# Patient Record
Sex: MALE | Race: WHITE | NOT HISPANIC OR LATINO
[De-identification: names, ages, dates, MRNs, and addresses within clinical notes are randomized per-mention and may not be internally consistent; named-entity substitution may affect disease eponyms.]

---

## 2019-03-14 ENCOUNTER — TRANSCRIPTION ENCOUNTER (OUTPATIENT)
Age: 69
End: 2019-03-14

## 2019-03-14 ENCOUNTER — INPATIENT (INPATIENT)
Facility: HOSPITAL | Age: 69
LOS: 2 days | Discharge: HOME | DRG: 336 | End: 2019-03-17
Attending: FAMILY MEDICINE | Admitting: SPECIALIST
Payer: MEDICARE

## 2019-03-14 VITALS
SYSTOLIC BLOOD PRESSURE: 125 MMHG | WEIGHT: 244.93 LBS | HEART RATE: 92 BPM | RESPIRATION RATE: 16 BRPM | HEIGHT: 71 IN | OXYGEN SATURATION: 94 % | DIASTOLIC BLOOD PRESSURE: 75 MMHG | TEMPERATURE: 98 F

## 2019-03-14 DIAGNOSIS — Z98.89 OTHER SPECIFIED POSTPROCEDURAL STATES: Chronic | ICD-10-CM

## 2019-03-14 LAB
ALBUMIN SERPL ELPH-MCNC: 4 G/DL — SIGNIFICANT CHANGE UP (ref 3.3–5)
ALP SERPL-CCNC: 85 U/L — SIGNIFICANT CHANGE UP (ref 40–120)
ALT FLD-CCNC: 31 U/L — SIGNIFICANT CHANGE UP (ref 12–78)
ANION GAP SERPL CALC-SCNC: 8 MMOL/L — SIGNIFICANT CHANGE UP (ref 5–17)
APPEARANCE UR: CLEAR — SIGNIFICANT CHANGE UP
AST SERPL-CCNC: 22 U/L — SIGNIFICANT CHANGE UP (ref 15–37)
BACTERIA # UR AUTO: ABNORMAL
BILIRUB SERPL-MCNC: 0.4 MG/DL — SIGNIFICANT CHANGE UP (ref 0.2–1.2)
BILIRUB UR-MCNC: NEGATIVE — SIGNIFICANT CHANGE UP
BUN SERPL-MCNC: 23 MG/DL — SIGNIFICANT CHANGE UP (ref 7–23)
CALCIUM SERPL-MCNC: 8.9 MG/DL — SIGNIFICANT CHANGE UP (ref 8.5–10.1)
CHLORIDE SERPL-SCNC: 104 MMOL/L — SIGNIFICANT CHANGE UP (ref 96–108)
CO2 SERPL-SCNC: 28 MMOL/L — SIGNIFICANT CHANGE UP (ref 22–31)
COLOR SPEC: YELLOW — SIGNIFICANT CHANGE UP
COMMENT - URINE: SIGNIFICANT CHANGE UP
CREAT SERPL-MCNC: 1.2 MG/DL — SIGNIFICANT CHANGE UP (ref 0.5–1.3)
DIFF PNL FLD: ABNORMAL
EPI CELLS # UR: SIGNIFICANT CHANGE UP
GLUCOSE SERPL-MCNC: 122 MG/DL — HIGH (ref 70–99)
GLUCOSE UR QL: NEGATIVE — SIGNIFICANT CHANGE UP
HCT VFR BLD CALC: 44.2 % — SIGNIFICANT CHANGE UP (ref 39–50)
HGB BLD-MCNC: 14.3 G/DL — SIGNIFICANT CHANGE UP (ref 13–17)
KETONES UR-MCNC: NEGATIVE — SIGNIFICANT CHANGE UP
LEUKOCYTE ESTERASE UR-ACNC: NEGATIVE — SIGNIFICANT CHANGE UP
MCHC RBC-ENTMCNC: 29.4 PG — SIGNIFICANT CHANGE UP (ref 27–34)
MCHC RBC-ENTMCNC: 32.4 GM/DL — SIGNIFICANT CHANGE UP (ref 32–36)
MCV RBC AUTO: 90.9 FL — SIGNIFICANT CHANGE UP (ref 80–100)
NITRITE UR-MCNC: NEGATIVE — SIGNIFICANT CHANGE UP
NRBC # BLD: 0 /100 WBCS — SIGNIFICANT CHANGE UP (ref 0–0)
PH UR: 5 — SIGNIFICANT CHANGE UP (ref 5–8)
PLATELET # BLD AUTO: 265 K/UL — SIGNIFICANT CHANGE UP (ref 150–400)
POTASSIUM SERPL-MCNC: 4.3 MMOL/L — SIGNIFICANT CHANGE UP (ref 3.5–5.3)
POTASSIUM SERPL-SCNC: 4.3 MMOL/L — SIGNIFICANT CHANGE UP (ref 3.5–5.3)
PROT SERPL-MCNC: 8.3 G/DL — SIGNIFICANT CHANGE UP (ref 6–8.3)
PROT UR-MCNC: 25 MG/DL
RBC # BLD: 4.86 M/UL — SIGNIFICANT CHANGE UP (ref 4.2–5.8)
RBC # FLD: 13.5 % — SIGNIFICANT CHANGE UP (ref 10.3–14.5)
RBC CASTS # UR COMP ASSIST: SIGNIFICANT CHANGE UP /HPF (ref 0–4)
SODIUM SERPL-SCNC: 140 MMOL/L — SIGNIFICANT CHANGE UP (ref 135–145)
SP GR SPEC: 1.02 — SIGNIFICANT CHANGE UP (ref 1.01–1.02)
UROBILINOGEN FLD QL: NEGATIVE — SIGNIFICANT CHANGE UP
WBC # BLD: 13.32 K/UL — HIGH (ref 3.8–10.5)
WBC # FLD AUTO: 13.32 K/UL — HIGH (ref 3.8–10.5)
WBC UR QL: SIGNIFICANT CHANGE UP

## 2019-03-14 RX ORDER — SODIUM CHLORIDE 9 MG/ML
1000 INJECTION INTRAMUSCULAR; INTRAVENOUS; SUBCUTANEOUS ONCE
Qty: 0 | Refills: 0 | Status: COMPLETED | OUTPATIENT
Start: 2019-03-14 | End: 2019-03-14

## 2019-03-14 RX ORDER — ONDANSETRON 8 MG/1
4 TABLET, FILM COATED ORAL ONCE
Qty: 0 | Refills: 0 | Status: COMPLETED | OUTPATIENT
Start: 2019-03-14 | End: 2019-03-14

## 2019-03-14 RX ORDER — HYDROMORPHONE HYDROCHLORIDE 2 MG/ML
0.5 INJECTION INTRAMUSCULAR; INTRAVENOUS; SUBCUTANEOUS ONCE
Qty: 0 | Refills: 0 | Status: DISCONTINUED | OUTPATIENT
Start: 2019-03-14 | End: 2019-03-14

## 2019-03-14 RX ADMIN — HYDROMORPHONE HYDROCHLORIDE 0.5 MILLIGRAM(S): 2 INJECTION INTRAMUSCULAR; INTRAVENOUS; SUBCUTANEOUS at 23:16

## 2019-03-14 RX ADMIN — ONDANSETRON 4 MILLIGRAM(S): 8 TABLET, FILM COATED ORAL at 23:16

## 2019-03-14 RX ADMIN — HYDROMORPHONE HYDROCHLORIDE 0.5 MILLIGRAM(S): 2 INJECTION INTRAMUSCULAR; INTRAVENOUS; SUBCUTANEOUS at 23:50

## 2019-03-14 RX ADMIN — SODIUM CHLORIDE 1000 MILLILITER(S): 9 INJECTION INTRAMUSCULAR; INTRAVENOUS; SUBCUTANEOUS at 23:15

## 2019-03-14 NOTE — ED ADULT NURSE NOTE - NSIMPLEMENTINTERV_GEN_ALL_ED
Implemented All Universal Safety Interventions:  [place] to call system. Call bell, personal items and telephone within reach. Instruct patient to call for assistance. Room bathroom lighting operational. Non-slip footwear when patient is off stretcher. Physically safe environment: no spills, clutter or unnecessary equipment. Stretcher in lowest position, wheels locked, appropriate side rails in place.

## 2019-03-14 NOTE — ED ADULT NURSE NOTE - OBJECTIVE STATEMENT
Received to the ED a&ox4, with abd pain in RLQ, rebound tenderness present and pain radiates to mid section of abd.

## 2019-03-15 ENCOUNTER — RESULT REVIEW (OUTPATIENT)
Age: 69
End: 2019-03-15

## 2019-03-15 DIAGNOSIS — Z98.890 OTHER SPECIFIED POSTPROCEDURAL STATES: Chronic | ICD-10-CM

## 2019-03-15 DIAGNOSIS — K37 UNSPECIFIED APPENDICITIS: ICD-10-CM

## 2019-03-15 DIAGNOSIS — Z90.49 ACQUIRED ABSENCE OF OTHER SPECIFIED PARTS OF DIGESTIVE TRACT: Chronic | ICD-10-CM

## 2019-03-15 LAB
APTT BLD: 35.6 SEC — SIGNIFICANT CHANGE UP (ref 27.5–36.3)
HCV AB S/CO SERPL IA: 0.08 S/CO — SIGNIFICANT CHANGE UP (ref 0–0.79)
HCV AB SERPL-IMP: SIGNIFICANT CHANGE UP
INR BLD: 1.27 RATIO — HIGH (ref 0.88–1.16)
LACTATE SERPL-SCNC: 0.5 MMOL/L — LOW (ref 0.7–2)
PROTHROM AB SERPL-ACNC: 14.5 SEC — HIGH (ref 10–12.9)

## 2019-03-15 PROCEDURE — 44970 LAPAROSCOPY APPENDECTOMY: CPT | Mod: AS

## 2019-03-15 PROCEDURE — 74176 CT ABD & PELVIS W/O CONTRAST: CPT | Mod: 26

## 2019-03-15 PROCEDURE — 99285 EMERGENCY DEPT VISIT HI MDM: CPT

## 2019-03-15 PROCEDURE — 99222 1ST HOSP IP/OBS MODERATE 55: CPT

## 2019-03-15 PROCEDURE — 93010 ELECTROCARDIOGRAM REPORT: CPT | Mod: 76

## 2019-03-15 PROCEDURE — 88304 TISSUE EXAM BY PATHOLOGIST: CPT | Mod: 26

## 2019-03-15 PROCEDURE — 71045 X-RAY EXAM CHEST 1 VIEW: CPT | Mod: 26

## 2019-03-15 PROCEDURE — 44970 LAPAROSCOPY APPENDECTOMY: CPT | Mod: 22

## 2019-03-15 RX ORDER — ATORVASTATIN CALCIUM 80 MG/1
20 TABLET, FILM COATED ORAL AT BEDTIME
Qty: 0 | Refills: 0 | Status: DISCONTINUED | OUTPATIENT
Start: 2019-03-15 | End: 2019-03-17

## 2019-03-15 RX ORDER — PIPERACILLIN AND TAZOBACTAM 4; .5 G/20ML; G/20ML
3.38 INJECTION, POWDER, LYOPHILIZED, FOR SOLUTION INTRAVENOUS ONCE
Qty: 0 | Refills: 0 | Status: COMPLETED | OUTPATIENT
Start: 2019-03-15 | End: 2019-03-15

## 2019-03-15 RX ORDER — HYDROMORPHONE HYDROCHLORIDE 2 MG/ML
1 INJECTION INTRAMUSCULAR; INTRAVENOUS; SUBCUTANEOUS EVERY 4 HOURS
Qty: 0 | Refills: 0 | Status: DISCONTINUED | OUTPATIENT
Start: 2019-03-15 | End: 2019-03-15

## 2019-03-15 RX ORDER — GABAPENTIN 400 MG/1
1200 CAPSULE ORAL
Qty: 0 | Refills: 0 | Status: DISCONTINUED | OUTPATIENT
Start: 2019-03-15 | End: 2019-03-17

## 2019-03-15 RX ORDER — HYDROMORPHONE HYDROCHLORIDE 2 MG/ML
1 INJECTION INTRAMUSCULAR; INTRAVENOUS; SUBCUTANEOUS
Qty: 0 | Refills: 0 | Status: DISCONTINUED | OUTPATIENT
Start: 2019-03-15 | End: 2019-03-15

## 2019-03-15 RX ORDER — GLUCAGON INJECTION, SOLUTION 0.5 MG/.1ML
1 INJECTION, SOLUTION SUBCUTANEOUS ONCE
Qty: 0 | Refills: 0 | Status: DISCONTINUED | OUTPATIENT
Start: 2019-03-15 | End: 2019-03-15

## 2019-03-15 RX ORDER — DEXTROSE 50 % IN WATER 50 %
15 SYRINGE (ML) INTRAVENOUS ONCE
Qty: 0 | Refills: 0 | Status: DISCONTINUED | OUTPATIENT
Start: 2019-03-15 | End: 2019-03-15

## 2019-03-15 RX ORDER — ATORVASTATIN CALCIUM 80 MG/1
1 TABLET, FILM COATED ORAL
Qty: 0 | Refills: 0 | COMMUNITY

## 2019-03-15 RX ORDER — DEXTROSE 50 % IN WATER 50 %
12.5 SYRINGE (ML) INTRAVENOUS ONCE
Qty: 0 | Refills: 0 | Status: DISCONTINUED | OUTPATIENT
Start: 2019-03-15 | End: 2019-03-17

## 2019-03-15 RX ORDER — SODIUM CHLORIDE 9 MG/ML
1000 INJECTION, SOLUTION INTRAVENOUS
Qty: 0 | Refills: 0 | Status: DISCONTINUED | OUTPATIENT
Start: 2019-03-15 | End: 2019-03-17

## 2019-03-15 RX ORDER — PIPERACILLIN AND TAZOBACTAM 4; .5 G/20ML; G/20ML
3.38 INJECTION, POWDER, LYOPHILIZED, FOR SOLUTION INTRAVENOUS EVERY 8 HOURS
Qty: 0 | Refills: 0 | Status: DISCONTINUED | OUTPATIENT
Start: 2019-03-15 | End: 2019-03-15

## 2019-03-15 RX ORDER — PIPERACILLIN AND TAZOBACTAM 4; .5 G/20ML; G/20ML
3.38 INJECTION, POWDER, LYOPHILIZED, FOR SOLUTION INTRAVENOUS EVERY 8 HOURS
Qty: 0 | Refills: 0 | Status: DISCONTINUED | OUTPATIENT
Start: 2019-03-15 | End: 2019-03-17

## 2019-03-15 RX ORDER — GABAPENTIN 400 MG/1
2 CAPSULE ORAL
Qty: 0 | Refills: 0 | COMMUNITY

## 2019-03-15 RX ORDER — ONDANSETRON 8 MG/1
4 TABLET, FILM COATED ORAL ONCE
Qty: 0 | Refills: 0 | Status: COMPLETED | OUTPATIENT
Start: 2019-03-15 | End: 2019-03-15

## 2019-03-15 RX ORDER — HYDROMORPHONE HYDROCHLORIDE 2 MG/ML
1 INJECTION INTRAMUSCULAR; INTRAVENOUS; SUBCUTANEOUS EVERY 4 HOURS
Qty: 0 | Refills: 0 | Status: DISCONTINUED | OUTPATIENT
Start: 2019-03-15 | End: 2019-03-17

## 2019-03-15 RX ORDER — CEFAZOLIN SODIUM 1 G
2000 VIAL (EA) INJECTION ONCE
Qty: 0 | Refills: 0 | Status: COMPLETED | OUTPATIENT
Start: 2019-03-15 | End: 2019-03-15

## 2019-03-15 RX ORDER — INSULIN LISPRO 100/ML
VIAL (ML) SUBCUTANEOUS
Qty: 0 | Refills: 0 | Status: DISCONTINUED | OUTPATIENT
Start: 2019-03-15 | End: 2019-03-17

## 2019-03-15 RX ORDER — IOHEXOL 300 MG/ML
30 INJECTION, SOLUTION INTRAVENOUS ONCE
Qty: 0 | Refills: 0 | Status: COMPLETED | OUTPATIENT
Start: 2019-03-15 | End: 2019-03-15

## 2019-03-15 RX ORDER — SODIUM CHLORIDE 9 MG/ML
1000 INJECTION, SOLUTION INTRAVENOUS
Qty: 0 | Refills: 0 | Status: DISCONTINUED | OUTPATIENT
Start: 2019-03-15 | End: 2019-03-15

## 2019-03-15 RX ORDER — DEXTROSE 50 % IN WATER 50 %
15 SYRINGE (ML) INTRAVENOUS ONCE
Qty: 0 | Refills: 0 | Status: DISCONTINUED | OUTPATIENT
Start: 2019-03-15 | End: 2019-03-17

## 2019-03-15 RX ORDER — HYDROMORPHONE HYDROCHLORIDE 2 MG/ML
0.5 INJECTION INTRAMUSCULAR; INTRAVENOUS; SUBCUTANEOUS ONCE
Qty: 0 | Refills: 0 | Status: DISCONTINUED | OUTPATIENT
Start: 2019-03-15 | End: 2019-03-15

## 2019-03-15 RX ORDER — ONDANSETRON 8 MG/1
4 TABLET, FILM COATED ORAL EVERY 6 HOURS
Qty: 0 | Refills: 0 | Status: DISCONTINUED | OUTPATIENT
Start: 2019-03-15 | End: 2019-03-15

## 2019-03-15 RX ORDER — HYDROMORPHONE HYDROCHLORIDE 2 MG/ML
0.5 INJECTION INTRAMUSCULAR; INTRAVENOUS; SUBCUTANEOUS EVERY 4 HOURS
Qty: 0 | Refills: 0 | Status: DISCONTINUED | OUTPATIENT
Start: 2019-03-15 | End: 2019-03-15

## 2019-03-15 RX ORDER — DEXTROSE 50 % IN WATER 50 %
25 SYRINGE (ML) INTRAVENOUS ONCE
Qty: 0 | Refills: 0 | Status: DISCONTINUED | OUTPATIENT
Start: 2019-03-15 | End: 2019-03-15

## 2019-03-15 RX ORDER — DEXTROSE 50 % IN WATER 50 %
25 SYRINGE (ML) INTRAVENOUS ONCE
Qty: 0 | Refills: 0 | Status: DISCONTINUED | OUTPATIENT
Start: 2019-03-15 | End: 2019-03-17

## 2019-03-15 RX ORDER — HYDROMORPHONE HYDROCHLORIDE 2 MG/ML
0.5 INJECTION INTRAMUSCULAR; INTRAVENOUS; SUBCUTANEOUS EVERY 4 HOURS
Qty: 0 | Refills: 0 | Status: DISCONTINUED | OUTPATIENT
Start: 2019-03-15 | End: 2019-03-17

## 2019-03-15 RX ORDER — INSULIN LISPRO 100/ML
VIAL (ML) SUBCUTANEOUS
Qty: 0 | Refills: 0 | Status: DISCONTINUED | OUTPATIENT
Start: 2019-03-15 | End: 2019-03-15

## 2019-03-15 RX ORDER — ASPIRIN/CALCIUM CARB/MAGNESIUM 324 MG
1 TABLET ORAL
Qty: 0 | Refills: 0 | COMMUNITY

## 2019-03-15 RX ORDER — DEXTROSE 50 % IN WATER 50 %
12.5 SYRINGE (ML) INTRAVENOUS ONCE
Qty: 0 | Refills: 0 | Status: DISCONTINUED | OUTPATIENT
Start: 2019-03-15 | End: 2019-03-15

## 2019-03-15 RX ORDER — ACETAMINOPHEN 500 MG
1000 TABLET ORAL ONCE
Qty: 0 | Refills: 0 | Status: COMPLETED | OUTPATIENT
Start: 2019-03-15 | End: 2019-03-15

## 2019-03-15 RX ORDER — GLUCAGON INJECTION, SOLUTION 0.5 MG/.1ML
1 INJECTION, SOLUTION SUBCUTANEOUS ONCE
Qty: 0 | Refills: 0 | Status: DISCONTINUED | OUTPATIENT
Start: 2019-03-15 | End: 2019-03-17

## 2019-03-15 RX ORDER — SODIUM CHLORIDE 9 MG/ML
1000 INJECTION INTRAMUSCULAR; INTRAVENOUS; SUBCUTANEOUS
Qty: 0 | Refills: 0 | Status: DISCONTINUED | OUTPATIENT
Start: 2019-03-15 | End: 2019-03-16

## 2019-03-15 RX ORDER — METFORMIN HYDROCHLORIDE 850 MG/1
1 TABLET ORAL
Qty: 0 | Refills: 0 | COMMUNITY

## 2019-03-15 RX ORDER — ACETAMINOPHEN 500 MG
1000 TABLET ORAL ONCE
Qty: 0 | Refills: 0 | Status: COMPLETED | OUTPATIENT
Start: 2019-03-16 | End: 2019-03-16

## 2019-03-15 RX ORDER — HYDROMORPHONE HYDROCHLORIDE 2 MG/ML
0.5 INJECTION INTRAMUSCULAR; INTRAVENOUS; SUBCUTANEOUS
Qty: 0 | Refills: 0 | Status: DISCONTINUED | OUTPATIENT
Start: 2019-03-15 | End: 2019-03-15

## 2019-03-15 RX ADMIN — SODIUM CHLORIDE 50 MILLILITER(S): 9 INJECTION, SOLUTION INTRAVENOUS at 13:45

## 2019-03-15 RX ADMIN — Medication 400 MILLIGRAM(S): at 21:21

## 2019-03-15 RX ADMIN — PIPERACILLIN AND TAZOBACTAM 25 GRAM(S): 4; .5 INJECTION, POWDER, LYOPHILIZED, FOR SOLUTION INTRAVENOUS at 15:22

## 2019-03-15 RX ADMIN — GABAPENTIN 1200 MILLIGRAM(S): 400 CAPSULE ORAL at 17:17

## 2019-03-15 RX ADMIN — IOHEXOL 30 MILLILITER(S): 300 INJECTION, SOLUTION INTRAVENOUS at 00:31

## 2019-03-15 RX ADMIN — ATORVASTATIN CALCIUM 20 MILLIGRAM(S): 80 TABLET, FILM COATED ORAL at 21:22

## 2019-03-15 RX ADMIN — PIPERACILLIN AND TAZOBACTAM 200 GRAM(S): 4; .5 INJECTION, POWDER, LYOPHILIZED, FOR SOLUTION INTRAVENOUS at 03:48

## 2019-03-15 RX ADMIN — PIPERACILLIN AND TAZOBACTAM 25 GRAM(S): 4; .5 INJECTION, POWDER, LYOPHILIZED, FOR SOLUTION INTRAVENOUS at 22:40

## 2019-03-15 RX ADMIN — ONDANSETRON 4 MILLIGRAM(S): 8 TABLET, FILM COATED ORAL at 14:08

## 2019-03-15 RX ADMIN — HYDROMORPHONE HYDROCHLORIDE 0.5 MILLIGRAM(S): 2 INJECTION INTRAMUSCULAR; INTRAVENOUS; SUBCUTANEOUS at 03:48

## 2019-03-15 RX ADMIN — SODIUM CHLORIDE 100 MILLILITER(S): 9 INJECTION, SOLUTION INTRAVENOUS at 07:50

## 2019-03-15 RX ADMIN — SODIUM CHLORIDE 100 MILLILITER(S): 9 INJECTION INTRAMUSCULAR; INTRAVENOUS; SUBCUTANEOUS at 15:22

## 2019-03-15 RX ADMIN — ONDANSETRON 4 MILLIGRAM(S): 8 TABLET, FILM COATED ORAL at 03:48

## 2019-03-15 RX ADMIN — SODIUM CHLORIDE 100 MILLILITER(S): 9 INJECTION, SOLUTION INTRAVENOUS at 15:07

## 2019-03-15 RX ADMIN — SODIUM CHLORIDE 1000 MILLILITER(S): 9 INJECTION INTRAMUSCULAR; INTRAVENOUS; SUBCUTANEOUS at 00:15

## 2019-03-15 NOTE — H&P ADULT - GASTROINTESTINAL DETAILS
no bruit/no guarding/no distention/bowel sounds normal/no organomegaly/no rigidity/no masses palpable/soft

## 2019-03-15 NOTE — BRIEF OPERATIVE NOTE - NSICDXBRIEFPREOP_GEN_ALL_CORE_FT
PRE-OP DIAGNOSIS:  Umbilical hernia 15-Mar-2019 13:48:23  Lucille Stiles  Acute appendicitis 15-Mar-2019 13:47:55  Lucille Stiles

## 2019-03-15 NOTE — BRIEF OPERATIVE NOTE - NSICDXBRIEFPROCEDURE_GEN_ALL_CORE_FT
PROCEDURES:  Repair, hernia, umbilical, open, adult 15-Mar-2019 13:47:39  Lucille Stiles  Laparoscopic appendectomy 15-Mar-2019 13:46:59  Lucille Stiles

## 2019-03-15 NOTE — BRIEF OPERATIVE NOTE - NSICDXBRIEFPOSTOP_GEN_ALL_CORE_FT
POST-OP DIAGNOSIS:  Acute gangrenous appendicitis 15-Mar-2019 13:51:20  Lucille Stiles  Umbilical hernia 15-Mar-2019 13:48:40  Lucille Stiles

## 2019-03-15 NOTE — PRE-OP CHECKLIST - SELECT TESTS ORDERED
Results in MD note/CBC/CMP/PT/PTT/INR CBC/CMP/PT/PTT/INR/Type and Screen/Results in MD note Results in MD note/Type and Screen/CBC/PT/PTT/INR/POCT Blood Glucose/CMP

## 2019-03-15 NOTE — H&P ADULT - HISTORY OF PRESENT ILLNESS
69 YO MALE WITH H/O MULTIPLE MYELOMA, TYPE 2 DIABETES, NEUROPATHIC PAIN, S/P OPEN CHOLECYSTECTOMY PRESENTED TO ER WITH ONE DAY H/O RLQ PAIN. THE PAIN STATED ON THURSDAY MORNING AS A DISCOMFORT AND GRADUALLY INCREASED THROUGHOUT THE DAY.     HE DENIES ANY N/V, FEVER, CHILLS, CONSTIPATION, DIARRHEA CHANGE IN BOWEL HABITS, TRAUMA, RECENT TRAVEL, ETOH/ DRUG USE, URINARY SYMPTOMS. ANOREXIA. HE HAD A NORMAL EGD AND COLONOSCOPY 7 YEARS AGO     HE LAST ATE AROUND 5 PM AND HAD A NORMAL BM YESTERDAY. 67 YO MALE WITH H/O MULTIPLE MYELOMA, TYPE 2 DIABETES, HYPERLIPIDEMIA,  NEUROPATHIC PAIN, S/P OPEN CHOLECYSTECTOMY PRESENTED TO ER WITH ONE DAY H/O RLQ PAIN. THE PAIN STATED ON THURSDAY MORNING AS A DISCOMFORT AND GRADUALLY INCREASED THROUGHOUT THE DAY.     HE DENIES ANY N/V, FEVER, CHILLS, CONSTIPATION, DIARRHEA CHANGE IN BOWEL HABITS, TRAUMA, RECENT TRAVEL, ETOH/ DRUG USE, URINARY SYMPTOMS. ANOREXIA. HE HAD A NORMAL EGD AND COLONOSCOPY 7 YEARS AGO     HE LAST ATE AROUND 5 PM AND HAD A NORMAL BM YESTERDAY.

## 2019-03-15 NOTE — ED PROVIDER NOTE - CLINICAL SUMMARY MEDICAL DECISION MAKING FREE TEXT BOX
RLQ pain x 36 hours r/o Appendicitis IVF la 67 y/o WM h/o Multiple myeloma, AODM, RLQ pain x 36 hours r/o Appendicitis IVF lab CXR EKG lactate C scan IV zosyn, surgical consult

## 2019-03-15 NOTE — ED PROVIDER NOTE - OBJECTIVE STATEMENT
67 y/o WM h/o Multiple myeloma, C/O RLQ abdominal pain x 24 hours that intensified this evening. He has chronic back pain due to his myeloma but notes that it is increased. His appetite  is slightly reduced, his last meal was at 6PM. No N/V/D, no urinary symptoms, no GIB. 67 y/o WM h/o Multiple myeloma, C/O RLQ abdominal pain x 36 hours that intensified this evening. He has chronic back pain due to his myeloma but notes that it is increased. His appetite  is slightly reduced, his last meal was at 6PM. No N/V/D, no urinary symptoms, no GIB. 69 y/o WM h/o Multiple myeloma, AODM  C/O RLQ abdominal pain x 36 hours that intensified this evening. He has chronic back pain due to his myeloma but notes that it is increased. His appetite  is slightly reduced, his last meal was at 6PM. No N/V/D, no urinary symptoms, no GIB.

## 2019-03-15 NOTE — H&P ADULT - ASSESSMENT
APPENDICITIS  H/O MULTIPLE MYELOMA  H/O TYPE 2 DIABETES  H/O DVT IN THE PAST     ADMIT  NPO  ZOSYN  TO OR FOR LAPAROSCOPIC POSSIBLE OPEN APPENDECTOMY

## 2019-03-15 NOTE — CONSULT NOTE ADULT - ASSESSMENT
69y/o M. with hx of MM, DVT of RLE in 2014, Type 2 DM, and HLD presenting with RLQ abdominal pain and found to have acute appendicitis.    -Acute appendicitis:  NPO + LR@100cc/hr.  Continue IV Zosyn.  Dilaudid PRN and Zofran PRN.  Pt. is without symptoms of ACS or decompensated CHF.  He can achieve METS>4.  Pt. did report having a stress test about 6 months ago which showed a "small blockage."  Will consult cardiology for preop evaluation.    -Type 2 DM:  continue low dose humalog sliding scale  -Hx of DVT:  would consider restarting anticoagulation when feasible post-op.  -Hyperlipidemia: restart statin when taking PO.   -VTE ppx: no chemical prophylaxis in preparation for OR today.

## 2019-03-15 NOTE — CONSULT NOTE ADULT - ASSESSMENT
69 Yo male with the PMH of Multiple Myeloma , NIDDM , HLD , S/p cholecystectomy admitted with RLQ abdominal pain and CT showed evidence of acute appendicitis . He is going for appendectomy . Cardiac consult requested for preop risk stratification     Prop cardiovascular exam: Pt does not have any active cardiac conditions, no anginal symptoms , no arrythmia /decompensated heart failure . He is ffairly functional > 4 METSs.   EKG NSR , Wnl , Hemodynamically stable .    Cardica statis is stable to proceed with appendicectomy 67 Yo male with the PMH of Multiple Myeloma , NIDDM , HLD , S/p cholecystectomy admitted with RLQ abdominal pain and CT showed evidence of acute appendicitis . He is going for appendectomy . Cardiac consult requested for preop risk stratification . He follows with Dr. Bustillo and reportedly had a Lexiscan MPI 2 years ago as a routine stress test which was mildly abnormal , however he was managed medically as he is asymptomatic and fear of contrast induced nephropathy in the setting of MM . He is on ASA and Lipitor     Prop cardiovascular exam: Pt does not have any active cardiac conditions, no anginal symptoms , no arrythmia /decompensated heart failure . He is fairly functional > 4 METSs.   EKG NSR , Wnl , Hemodynamically stable .  CAD /HLD : Stable , Resume ASA and statin after the surgery     Cardica statis is stable to proceed with appendicectomy

## 2019-03-15 NOTE — H&P ADULT - NSICDXPASTMEDICALHX_GEN_ALL_CORE_FT
PAST MEDICAL HISTORY:  Blood clot in vein post-op    DM (diabetes mellitus)     Multiple myeloma     Neuropathic pain PAST MEDICAL HISTORY:  Blood clot in vein post-op    DM (diabetes mellitus)     Hyperlipidemia     Multiple myeloma     Neuropathic pain

## 2019-03-15 NOTE — H&P ADULT - NSICDXPASTSURGICALHX_GEN_ALL_CORE_FT
PAST SURGICAL HISTORY:  H/O colonoscopy     History of back surgery tumor resection    History of esophagogastroduodenoscopy (EGD)     S/P cholecystectomy LAPAROSCOPIC CONVERT TO OPEN

## 2019-03-15 NOTE — H&P ADULT - NEGATIVE GASTROINTESTINAL SYMPTOMS
no melena/no jaundice/no change in bowel habits/no nausea/no steatorrhea/no constipation/no hematochezia/no hiccoughs/no diarrhea/no vomiting

## 2019-03-15 NOTE — H&P ADULT - NSHPLABSRESULTS_GEN_ALL_CORE
03-14    140  |  104  |  23  ----------------------------<  122<H>  4.3   |  28  |  1.20    Ca    8.9      14 Mar 2019 23:12    TPro  8.3  /  Alb  4.0  /  TBili  0.4  /  DBili  x   /  AST  22  /  ALT  31  /  AlkPhos  85  03-14                          14.3   13.32 )-----------( 265      ( 14 Mar 2019 23:12 )             44.2       CT Abdomen and Pelvis w/ Oral Cont (03.15.19 @ 03:01)                 INTERPRETATION:  CLINICAL INFORMATION: Right lower quadrant abdominal   pain.    TECHNIQUE: Helical CT of the abdomen and pelvis was performed after the  administration of oral contrast. Intravenous contrast was not   administered. Evaluation of the visceral organs is limited without   intravenous contrast. Coronal and sagittal reformats were additionally   performed.     COMPARISON: No similar prior studies are available for comparison.    FINDINGS:  Lung bases: Subsegmental atelectasis is seen in the lung bases.    Organs: The patient is status post a cholecystectomy. Bilateral renal   cysts are noted. Noncontrast evaluation of the liver, spleen, pancreas    and adrenal glands are unremarkable.    Gastrointestinal tract: The appendix is dilated measuring up to 1.7 cm   with periappendiceal inflammatory changes and multiple appendicoliths in   the base of the appendix measuring up to 5 mm compatible with an acute   appendicitis. No adjacent focal fluid collection is seen to suggest an   abscess. There is no evidence of a bowel obstruction. A small second   portion duodenal diverticulum is noted. Colonic diverticulosis is seen   without evidence of diverticulitis.    Vascular: There is no abdominal aortic aneurysm. An infrarenal IVC filter   is noted.    Pelvis: The prostate is enlarged measuring up to 5.5 cm. The urinary   bladder and seminal vesicles are unremarkable. A small fat containing   left inguinal hernia is noted.    Miscellaneous: There is no significant abdominal or pelvic   lymphadenopathy. No free air or free fluid is demonstrated. A small fat   containing umbilical hernia is noted.    Bones: Degenerative changes of the spine are seen. L1-L5 posterior spinal   fusion hardware is noted.    IMPRESSION:  Uncomplicated acute appendicitis.     Call Back:  I discussed this case with Dr. Martin at 3:13 AM on   3/15/2019.  Hospital policies for call back including read back policy   were followed. The verbal communication call back supplements this   written report.    ELEONORA GREGORY M.D., ATTENDING RADIOLOGIST 03-14    140  |  104  |  23  ----------------------------<  122<H>  4.3   |  28  |  1.20    Ca    8.9      14 Mar 2019 23:12    TPro  8.3  /  Alb  4.0  /  TBili  0.4  /  DBili  x   /  AST  22  /  ALT  31  /  AlkPhos  85  03-14                          14.3   13.32 )-----------( 265      ( 14 Mar 2019 23:12 )             44.2     PT/INR - ( 15 Mar 2019 06:09 )   PT: 14.5 sec;   INR: 1.27 ratio     PTT - ( 15 Mar 2019 06:09 )  PTT:35.6 sec    Type + Screen (03.15.19 @ 06:09)    ABO RH Interpretation: A POS    CT Abdomen and Pelvis w/ Oral Cont (03.15.19 @ 03:01)                 INTERPRETATION:  CLINICAL INFORMATION: Right lower quadrant abdominal   pain.    TECHNIQUE: Helical CT of the abdomen and pelvis was performed after the  administration of oral contrast. Intravenous contrast was not   administered. Evaluation of the visceral organs is limited without   intravenous contrast. Coronal and sagittal reformats were additionally   performed.     COMPARISON: No similar prior studies are available for comparison.    FINDINGS:  Lung bases: Subsegmental atelectasis is seen in the lung bases.    Organs: The patient is status post a cholecystectomy. Bilateral renal   cysts are noted. Noncontrast evaluation of the liver, spleen, pancreas    and adrenal glands are unremarkable.    Gastrointestinal tract: The appendix is dilated measuring up to 1.7 cm   with periappendiceal inflammatory changes and multiple appendicoliths in   the base of the appendix measuring up to 5 mm compatible with an acute   appendicitis. No adjacent focal fluid collection is seen to suggest an   abscess. There is no evidence of a bowel obstruction. A small second   portion duodenal diverticulum is noted. Colonic diverticulosis is seen   without evidence of diverticulitis.    Vascular: There is no abdominal aortic aneurysm. An infrarenal IVC filter   is noted.    Pelvis: The prostate is enlarged measuring up to 5.5 cm. The urinary   bladder and seminal vesicles are unremarkable. A small fat containing   left inguinal hernia is noted.    Miscellaneous: There is no significant abdominal or pelvic   lymphadenopathy. No free air or free fluid is demonstrated. A small fat   containing umbilical hernia is noted.    Bones: Degenerative changes of the spine are seen. L1-L5 posterior spinal   fusion hardware is noted.    IMPRESSION:  Uncomplicated acute appendicitis.     Call Back:  I discussed this case with Dr. Martin at 3:13 AM on   3/15/2019.  Hospital policies for call back including read back policy   were followed. The verbal communication call back supplements this   written report.    ELEONORA GREGORY M.D., ATTENDING RADIOLOGIST

## 2019-03-15 NOTE — CONSULT NOTE ADULT - SUBJECTIVE AND OBJECTIVE BOX
REASON FOR CONSULT: Patient is a 68y old  Male who presents with a chief complaint of RLQ ABDOMINAL PAIN (15 Mar 2019 08:29)      CHIEF COMPLAINT: Patient is a 68y old  Male who presents with a chief complaint of RLQ ABDOMINAL PAIN (15 Mar 2019 08:29)      HPI:  67 YO MALE WITH H/O MULTIPLE MYELOMA, TYPE 2 DIABETES, HYPERLIPIDEMIA,  NEUROPATHIC PAIN, S/P OPEN CHOLECYSTECTOMY PRESENTED TO ER WITH ONE DAY H/O RLQ PAIN. THE PAIN STATED ON THURSDAY MORNING AS A DISCOMFORT AND GRADUALLY INCREASED THROUGHOUT THE DAY.     HE DENIES ANY N/V, FEVER, CHILLS, CONSTIPATION, DIARRHEA CHANGE IN BOWEL HABITS, TRAUMA, RECENT TRAVEL, ETOH/ DRUG USE, URINARY SYMPTOMS. ANOREXIA. HE HAD A NORMAL EGD AND COLONOSCOPY 7 YEARS AGO     HE LAST ATE AROUND 5 PM AND HAD A NORMAL BM YESTERDAY. (15 Mar 2019 05:54)        PAST MEDICAL & SURGICAL HISTORY:  Hyperlipidemia  DM (diabetes mellitus)  Neuropathic pain  Blood clot in vein: post-op  Multiple myeloma  S/P cholecystectomy: LAPAROSCOPIC CONVERT TO OPEN  History of esophagogastroduodenoscopy (EGD)  H/O colonoscopy  History of back surgery: tumor resection        FAMILY HISTORY: FAMILY HISTORY:  No pertinent family history in first degree relatives      MEDICATIONS  (STANDING):  dextrose 5%. 1000 milliLiter(s) (50 mL/Hr) IV Continuous <Continuous>  dextrose 50% Injectable 12.5 Gram(s) IV Push once  dextrose 50% Injectable 25 Gram(s) IV Push once  dextrose 50% Injectable 25 Gram(s) IV Push once  insulin lispro (HumaLOG) corrective regimen sliding scale   SubCutaneous three times a day before meals  lactated ringers. 1000 milliLiter(s) (100 mL/Hr) IV Continuous <Continuous>  piperacillin/tazobactam IVPB. 3.375 Gram(s) IV Intermittent every 8 hours    MEDICATIONS  (PRN):  dextrose 40% Gel 15 Gram(s) Oral once PRN Blood Glucose LESS THAN 70 milliGRAM(s)/deciliter  glucagon  Injectable 1 milliGRAM(s) IntraMuscular once PRN Glucose LESS THAN 70 milligrams/deciliter  HYDROmorphone  Injectable 1 milliGRAM(s) IV Push every 4 hours PRN Severe Pain (7 - 10)  HYDROmorphone  Injectable 0.5 milliGRAM(s) IV Push every 4 hours PRN Moderate Pain (4 - 6)  ondansetron Injectable 4 milliGRAM(s) IV Push every 6 hours PRN Nausea      Allergies    No Known Allergies    Intolerances        REVIEW OF SYSTEMS:    CONSTITUTIONAL: No fevers or chills  EYES/ENT: No visual changes;  No vertigo or throat pain   NECK: No pain or stiffness  RESPIRATORY: No cough, wheezing, hemoptysis; No shortness of breath  CARDIOVASCULAR: No chest pain or palpitations  GASTROINTESTINAL: + RLQ abdominal pain., No nausea, vomiting, or hematemesis; No diarrhea or constipation. No melena or hematochezia.  GENITOURINARY: No dysuria, frequency or hematuria  NEUROLOGICAL: No numbness or weakness  SKIN: No itching or rash  All other review of systems is negative unless indicated above    VITAL SIGNS:   Vital Signs Last 24 Hrs  T(C): 37.4 (15 Mar 2019 07:32), Max: 37.4 (15 Mar 2019 07:32)  T(F): 99.4 (15 Mar 2019 07:32), Max: 99.4 (15 Mar 2019 07:32)  HR: 75 (15 Mar 2019 07:32) (75 - 92)  BP: 121/76 (15 Mar 2019 07:32) (107/68 - 125/75)  BP(mean): --  RR: 18 (15 Mar 2019 07:32) (16 - 18)  SpO2: 94% (15 Mar 2019 07:32) (94% - 97%)    I&O's Summary      PHYSICAL EXAM:    Constitutional: awake and alert, in no distress  Eyes:  EOMI,  Pupils round, No oral cyanosis.  Pulmonary: Non-labored, breath sounds are clear bilaterally, No wheezing, rales or rhonchi  Cardiovascular: S1 and S2, regular rate and rhythm, no Murmurs, gallops or rubs  Gastrointestinal: Bowel Sounds present, soft, nontender.   Lymph: No peripheral edema. No cervical lymphadenopathy.  Neurological: Alert, no focal deficits  Skin: No rashes.  Psych:  Mood & affect appropriate    LABS: All Labs Reviewed:                        14.3   13.32 )-----------( 265      ( 14 Mar 2019 23:12 )             44.2     14 Mar 2019 23:12    140    |  104    |  23     ----------------------------<  122    4.3     |  28     |  1.20     Ca    8.9        14 Mar 2019 23:12    TPro  8.3    /  Alb  4.0    /  TBili  0.4    /  DBili  x      /  AST  22     /  ALT  31     /  AlkPhos  85     14 Mar 2019 23:12    PT/INR - ( 15 Mar 2019 06:09 )   PT: 14.5 sec;   INR: 1.27 ratio         PTT - ( 15 Mar 2019 06:09 )  PTT:35.6 sec      Blood Culture:         EKG: NSR , WNl     Imaging: < from: Xray Chest 1 View-PORTABLE IMMEDIATE (03.15.19 @ 00:19) >  EXAM:  XR CHEST PORTABLE IMMED 1V                            PROCEDURE DATE:  03/15/2019          INTERPRETATION:  CLINICAL STATEMENT: Chest pain.    TECHNIQUE: AP view of the chest.    COMPARISON: 3/29/2016    FINDINGS/  IMPRESSION:  Atelectasis lung bases. No consolidation or pleural effusion    Heart size within normal limits.      < from: CT Abdomen and Pelvis w/ Oral Cont (03.15.19 @ 03:01) >  EXAM:  CT ABDOMEN AND PELVIS OC                            PROCEDURE DATE:  03/15/2019          INTERPRETATION:  CLINICAL INFORMATION: Right lower quadrant abdominal   pain.    TECHNIQUE: Helical CT of the abdomen and pelvis was performed after the  administration of oral contrast. Intravenous contrast was not   administered. Evaluation of the visceral organs is limited without   intravenous contrast. Coronal and sagittal reformats were additionally   performed.     COMPARISON: No similar prior studies are available for comparison.    FINDINGS:  Lung bases: Subsegmental atelectasis is seen in the lung bases.    Organs: The patient is status post a cholecystectomy. Bilateral renal   cysts are noted. Noncontrast evaluation of the liver, spleen, pancreas    and adrenal glands are unremarkable.    Gastrointestinal tract: The appendix is dilated measuring up to 1.7 cm   with periappendiceal inflammatory changes and multiple appendicoliths in   the base of the appendix measuring up to 5 mm compatible with an acute   appendicitis. No adjacent focal fluid collection is seen to suggest an   abscess. There is no evidence of a bowel obstruction. A small second   portion duodenal diverticulum is noted. Colonic diverticulosis is seen   without evidence of diverticulitis.    Vascular: There is no abdominal aortic aneurysm. An infrarenal IVC filter   is noted.    Pelvis: The prostate is enlarged measuring up to 5.5 cm. The urinary   bladder and seminal vesicles are unremarkable. A small fat containing   left inguinal hernia is noted.    Miscellaneous: There is no significant abdominal or pelvic   lymphadenopathy. No free air or free fluid is demonstrated. A small fat   containing umbilical hernia is noted.    Bones: Degenerative changes of the spine are seen. L1-L5 posterior spinal   fusion hardware is noted.    IMPRESSION:  Uncomplicated acute appendicitis.     Call Back:  I discussed this case with Dr. Martin at 3:13 AM on   3/15/2019.  Hospital policies for call back including read back policy   were followed. The verbal communication call back supplements this   written report.                ELEONORA GREGORY M.D., ATTENDING RADIOLOGIST  This document has been electronically signed. Mar 15 2019  3:14AM

## 2019-03-15 NOTE — H&P ADULT - GASTROINTESTINAL COMMENTS
RLQ ABDOMINAL PAIN OLD WELL HEALED SCAR RUQ ,  LLQ TRANSVERSE SCAR OLD WELL HEALED SCAR RUQ ,  LLQ TRANSVERSE SCAR AND PARAMEDIAN , SMALL REDUCIBLE UMBILICAL HERNIA

## 2019-03-15 NOTE — PATIENT PROFILE ADULT - VISION (WITH CORRECTIVE LENSES IF THE PATIENT USUALLY WEARS THEM):
wears glasses for reading and distance./Partially impaired: cannot see medication labels or newsprint, but can see obstacles in path, and the surrounding layout; can count fingers at arm's length

## 2019-03-15 NOTE — CONSULT NOTE ADULT - SUBJECTIVE AND OBJECTIVE BOX
HPI:  69 YO MALE WITH H/O MULTIPLE MYELOMA, RLE DVT, TYPE 2 DIABETES, HYPERLIPIDEMIA,  NEUROPATHIC PAIN, S/P OPEN CHOLECYSTECTOMY PRESENTED TO ER WITH ONE DAY H/O RLQ PAIN. THE PAIN STATED ON THURSDAY MORNING AS A DISCOMFORT AND GRADUALLY INCREASED THROUGHOUT THE DAY.     HE DENIES ANY N/V, FEVER, CHILLS, CONSTIPATION, DIARRHEA CHANGE IN BOWEL HABITS, TRAUMA, RECENT TRAVEL, ETOH/ DRUG USE, URINARY SYMPTOMS. ANOREXIA. Rest of ROS negative.  HE HAD A NORMAL EGD AND COLONOSCOPY 7 YEARS AGO     HE LAST ATE AROUND 5 PM AND HAD A NORMAL BM YESTERDAY. (15 Mar 2019 05:54)    CT A/P in ED showed an acute appendicitis.  Pt. now scheduled for OR this morning.        PAST MEDICAL & SURGICAL HISTORY:  Hyperlipidemia  DM (diabetes mellitus)  Neuropathic pain  Blood clot in vein: post-op  Multiple myeloma  S/P cholecystectomy: LAPAROSCOPIC CONVERT TO OPEN  History of esophagogastroduodenoscopy (EGD)  H/O colonoscopy  History of back surgery: tumor resection      MEDICATIONS  (STANDING):  dextrose 5%. 1000 milliLiter(s) (50 mL/Hr) IV Continuous <Continuous>  dextrose 50% Injectable 12.5 Gram(s) IV Push once  dextrose 50% Injectable 25 Gram(s) IV Push once  dextrose 50% Injectable 25 Gram(s) IV Push once  insulin lispro (HumaLOG) corrective regimen sliding scale   SubCutaneous three times a day before meals  lactated ringers. 1000 milliLiter(s) (100 mL/Hr) IV Continuous <Continuous>    MEDICATIONS  (PRN):  dextrose 40% Gel 15 Gram(s) Oral once PRN Blood Glucose LESS THAN 70 milliGRAM(s)/deciliter  glucagon  Injectable 1 milliGRAM(s) IntraMuscular once PRN Glucose LESS THAN 70 milligrams/deciliter  HYDROmorphone  Injectable 1 milliGRAM(s) IV Push every 4 hours PRN Severe Pain (7 - 10)  HYDROmorphone  Injectable 0.5 milliGRAM(s) IV Push every 4 hours PRN Moderate Pain (4 - 6)  ondansetron Injectable 4 milliGRAM(s) IV Push every 6 hours PRN Nausea      Allergies    No Known Allergies    SOCIAL HISTORY: Quit tobacco use 20 years ago.  Before smoked 1ppd x 25 years.  Social alcohol use once a week.  Denies IVDA.     FAMILY HISTORY:  No pertinent family history in first degree relatives      Vital Signs Last 24 Hrs  T(C): 37.4 (15 Mar 2019 07:32), Max: 37.4 (15 Mar 2019 07:32)  T(F): 99.4 (15 Mar 2019 07:32), Max: 99.4 (15 Mar 2019 07:32)  HR: 75 (15 Mar 2019 07:32) (75 - 92)  BP: 121/76 (15 Mar 2019 07:32) (107/68 - 125/75)  BP(mean): --  RR: 18 (15 Mar 2019 07:32) (16 - 18)  SpO2: 94% (15 Mar 2019 07:32) (94% - 97%)  Physical Exam:   GEN: NAD  HEENT: EOMI, Anicteric sclera, MMM, OP clear, neck supple, and trachea is midline  CV: S1 S2, RRR  Lung: CTA bilaterally  Abd: soft NT ND +BS  Ext: no c/c/e  Neuro: AAOx3, CN II-XII intact, 5/5 strength of all extremities    LABS:                        14.3   13.32 )-----------( 265      ( 14 Mar 2019 23:12 )             44.2     03-14    140  |  104  |  23  ----------------------------<  122<H>  4.3   |  28  |  1.20    Ca    8.9      14 Mar 2019 23:12    TPro  8.3  /  Alb  4.0  /  TBili  0.4  /  DBili  x   /  AST  22  /  ALT  31  /  AlkPhos  85  03-14    PT/INR - ( 15 Mar 2019 06:09 )   PT: 14.5 sec;   INR: 1.27 ratio         PTT - ( 15 Mar 2019 06:09 )  PTT:35.6 sec  Urinalysis Basic - ( 14 Mar 2019 23:12 )    Color: Yellow / Appearance: Clear / S.020 / pH: x  Gluc: x / Ketone: Negative  / Bili: Negative / Urobili: Negative   Blood: x / Protein: 25 mg/dL / Nitrite: Negative   Leuk Esterase: Negative / RBC: 0-2 /HPF / WBC 0-2   Sq Epi: x / Non Sq Epi: Occasional / Bacteria: Occasional HPI:  67 YO MALE WITH H/O MULTIPLE MYELOMA, RLE DVT, TYPE 2 DIABETES, HYPERLIPIDEMIA,  NEUROPATHIC PAIN, S/P OPEN CHOLECYSTECTOMY PRESENTED TO ER WITH ONE DAY H/O RLQ PAIN. THE PAIN STATED ON THURSDAY MORNING AS A DISCOMFORT AND GRADUALLY INCREASED THROUGHOUT THE DAY.     HE DENIES ANY N/V, FEVER, CHILLS, CONSTIPATION, DIARRHEA CHANGE IN BOWEL HABITS, TRAUMA, RECENT TRAVEL, ETOH/ DRUG USE, URINARY SYMPTOMS. ANOREXIA. Rest of ROS negative.  HE HAD A NORMAL EGD AND COLONOSCOPY 7 YEARS AGO     HE LAST ATE AROUND 5 PM AND HAD A NORMAL BM YESTERDAY. (15 Mar 2019 05:54)    CT A/P in ED showed an acute appendicitis.  Pt. now scheduled for OR this morning.        PAST MEDICAL & SURGICAL HISTORY:  Hyperlipidemia  DM (diabetes mellitus)  Neuropathic pain  Blood clot in vein: post-op  Multiple myeloma  S/P cholecystectomy: LAPAROSCOPIC CONVERT TO OPEN  History of esophagogastroduodenoscopy (EGD)  H/O colonoscopy  History of back surgery: tumor resection      MEDICATIONS  (STANDING):  dextrose 5%. 1000 milliLiter(s) (50 mL/Hr) IV Continuous <Continuous>  dextrose 50% Injectable 12.5 Gram(s) IV Push once  dextrose 50% Injectable 25 Gram(s) IV Push once  dextrose 50% Injectable 25 Gram(s) IV Push once  insulin lispro (HumaLOG) corrective regimen sliding scale   SubCutaneous three times a day before meals  lactated ringers. 1000 milliLiter(s) (100 mL/Hr) IV Continuous <Continuous>    MEDICATIONS  (PRN):  dextrose 40% Gel 15 Gram(s) Oral once PRN Blood Glucose LESS THAN 70 milliGRAM(s)/deciliter  glucagon  Injectable 1 milliGRAM(s) IntraMuscular once PRN Glucose LESS THAN 70 milligrams/deciliter  HYDROmorphone  Injectable 1 milliGRAM(s) IV Push every 4 hours PRN Severe Pain (7 - 10)  HYDROmorphone  Injectable 0.5 milliGRAM(s) IV Push every 4 hours PRN Moderate Pain (4 - 6)  ondansetron Injectable 4 milliGRAM(s) IV Push every 6 hours PRN Nausea      Allergies    No Known Allergies    SOCIAL HISTORY: Quit tobacco use 20 years ago.  Before smoked 1ppd x 25 years.  Social alcohol use once a week.  Denies IVDA.     FAMILY HISTORY:  No pertinent family history in first degree relatives of abdominal infections.      Vital Signs Last 24 Hrs  T(C): 37.4 (15 Mar 2019 07:32), Max: 37.4 (15 Mar 2019 07:32)  T(F): 99.4 (15 Mar 2019 07:32), Max: 99.4 (15 Mar 2019 07:32)  HR: 75 (15 Mar 2019 07:32) (75 - 92)  BP: 121/76 (15 Mar 2019 07:32) (107/68 - 125/75)  BP(mean): --  RR: 18 (15 Mar 2019 07:32) (16 - 18)  SpO2: 94% (15 Mar 2019 07:32) (94% - 97%)  Physical Exam:   GEN: NAD  HEENT: EOMI, Anicteric sclera, MMM, OP clear, neck supple, and trachea is midline  CV: S1 S2, RRR  Lung: CTA bilaterally  Abd: soft NT ND +BS  Ext: no c/c/e  Neuro: AAOx3, CN II-XII intact, 5/5 strength of all extremities    LABS:                        14.3   13.32 )-----------( 265      ( 14 Mar 2019 23:12 )             44.2     03-14    140  |  104  |  23  ----------------------------<  122<H>  4.3   |  28  |  1.20    Ca    8.9      14 Mar 2019 23:12    TPro  8.3  /  Alb  4.0  /  TBili  0.4  /  DBili  x   /  AST  22  /  ALT  31  /  AlkPhos  85  03-14    PT/INR - ( 15 Mar 2019 06:09 )   PT: 14.5 sec;   INR: 1.27 ratio         PTT - ( 15 Mar 2019 06:09 )  PTT:35.6 sec  Urinalysis Basic - ( 14 Mar 2019 23:12 )    Color: Yellow / Appearance: Clear / S.020 / pH: x  Gluc: x / Ketone: Negative  / Bili: Negative / Urobili: Negative   Blood: x / Protein: 25 mg/dL / Nitrite: Negative   Leuk Esterase: Negative / RBC: 0-2 /HPF / WBC 0-2   Sq Epi: x / Non Sq Epi: Occasional / Bacteria: Occasional

## 2019-03-16 ENCOUNTER — TRANSCRIPTION ENCOUNTER (OUTPATIENT)
Age: 69
End: 2019-03-16

## 2019-03-16 LAB
ANION GAP SERPL CALC-SCNC: 6 MMOL/L — SIGNIFICANT CHANGE UP (ref 5–17)
BASOPHILS # BLD AUTO: 0.01 K/UL — SIGNIFICANT CHANGE UP (ref 0–0.2)
BASOPHILS NFR BLD AUTO: 0.1 % — SIGNIFICANT CHANGE UP (ref 0–2)
BUN SERPL-MCNC: 17 MG/DL — SIGNIFICANT CHANGE UP (ref 7–23)
CALCIUM SERPL-MCNC: 8.1 MG/DL — LOW (ref 8.5–10.1)
CHLORIDE SERPL-SCNC: 109 MMOL/L — HIGH (ref 96–108)
CO2 SERPL-SCNC: 28 MMOL/L — SIGNIFICANT CHANGE UP (ref 22–31)
CREAT SERPL-MCNC: 1.2 MG/DL — SIGNIFICANT CHANGE UP (ref 0.5–1.3)
EOSINOPHIL # BLD AUTO: 0 K/UL — SIGNIFICANT CHANGE UP (ref 0–0.5)
EOSINOPHIL NFR BLD AUTO: 0 % — SIGNIFICANT CHANGE UP (ref 0–6)
GLUCOSE SERPL-MCNC: 150 MG/DL — HIGH (ref 70–99)
HBA1C BLD-MCNC: 6 % — HIGH (ref 4–5.6)
HCT VFR BLD CALC: 37.1 % — LOW (ref 39–50)
HGB BLD-MCNC: 11.9 G/DL — LOW (ref 13–17)
IMM GRANULOCYTES NFR BLD AUTO: 0.5 % — SIGNIFICANT CHANGE UP (ref 0–1.5)
LYMPHOCYTES # BLD AUTO: 0.97 K/UL — LOW (ref 1–3.3)
LYMPHOCYTES # BLD AUTO: 8.9 % — LOW (ref 13–44)
MAGNESIUM SERPL-MCNC: 2.2 MG/DL — SIGNIFICANT CHANGE UP (ref 1.6–2.6)
MCHC RBC-ENTMCNC: 29.2 PG — SIGNIFICANT CHANGE UP (ref 27–34)
MCHC RBC-ENTMCNC: 32.1 GM/DL — SIGNIFICANT CHANGE UP (ref 32–36)
MCV RBC AUTO: 90.9 FL — SIGNIFICANT CHANGE UP (ref 80–100)
MONOCYTES # BLD AUTO: 0.85 K/UL — SIGNIFICANT CHANGE UP (ref 0–0.9)
MONOCYTES NFR BLD AUTO: 7.8 % — SIGNIFICANT CHANGE UP (ref 2–14)
NEUTROPHILS # BLD AUTO: 9 K/UL — HIGH (ref 1.8–7.4)
NEUTROPHILS NFR BLD AUTO: 82.7 % — HIGH (ref 43–77)
NRBC # BLD: 0 /100 WBCS — SIGNIFICANT CHANGE UP (ref 0–0)
PLATELET # BLD AUTO: 229 K/UL — SIGNIFICANT CHANGE UP (ref 150–400)
POTASSIUM SERPL-MCNC: 4.4 MMOL/L — SIGNIFICANT CHANGE UP (ref 3.5–5.3)
POTASSIUM SERPL-SCNC: 4.4 MMOL/L — SIGNIFICANT CHANGE UP (ref 3.5–5.3)
RBC # BLD: 4.08 M/UL — LOW (ref 4.2–5.8)
RBC # FLD: 13.4 % — SIGNIFICANT CHANGE UP (ref 10.3–14.5)
SODIUM SERPL-SCNC: 143 MMOL/L — SIGNIFICANT CHANGE UP (ref 135–145)
WBC # BLD: 10.88 K/UL — HIGH (ref 3.8–10.5)
WBC # FLD AUTO: 10.88 K/UL — HIGH (ref 3.8–10.5)

## 2019-03-16 PROCEDURE — 99233 SBSQ HOSP IP/OBS HIGH 50: CPT

## 2019-03-16 RX ADMIN — GABAPENTIN 1200 MILLIGRAM(S): 400 CAPSULE ORAL at 05:11

## 2019-03-16 RX ADMIN — HYDROMORPHONE HYDROCHLORIDE 0.5 MILLIGRAM(S): 2 INJECTION INTRAMUSCULAR; INTRAVENOUS; SUBCUTANEOUS at 18:42

## 2019-03-16 RX ADMIN — ATORVASTATIN CALCIUM 20 MILLIGRAM(S): 80 TABLET, FILM COATED ORAL at 21:20

## 2019-03-16 RX ADMIN — Medication 1000 MILLIGRAM(S): at 05:30

## 2019-03-16 RX ADMIN — GABAPENTIN 1200 MILLIGRAM(S): 400 CAPSULE ORAL at 17:54

## 2019-03-16 RX ADMIN — Medication 1000 MILLIGRAM(S): at 00:00

## 2019-03-16 RX ADMIN — PIPERACILLIN AND TAZOBACTAM 25 GRAM(S): 4; .5 INJECTION, POWDER, LYOPHILIZED, FOR SOLUTION INTRAVENOUS at 05:56

## 2019-03-16 RX ADMIN — HYDROMORPHONE HYDROCHLORIDE 1 MILLIGRAM(S): 2 INJECTION INTRAMUSCULAR; INTRAVENOUS; SUBCUTANEOUS at 03:55

## 2019-03-16 RX ADMIN — HYDROMORPHONE HYDROCHLORIDE 0.5 MILLIGRAM(S): 2 INJECTION INTRAMUSCULAR; INTRAVENOUS; SUBCUTANEOUS at 17:59

## 2019-03-16 RX ADMIN — Medication 400 MILLIGRAM(S): at 05:11

## 2019-03-16 RX ADMIN — PIPERACILLIN AND TAZOBACTAM 25 GRAM(S): 4; .5 INJECTION, POWDER, LYOPHILIZED, FOR SOLUTION INTRAVENOUS at 21:20

## 2019-03-16 RX ADMIN — SODIUM CHLORIDE 100 MILLILITER(S): 9 INJECTION INTRAMUSCULAR; INTRAVENOUS; SUBCUTANEOUS at 01:28

## 2019-03-16 RX ADMIN — HYDROMORPHONE HYDROCHLORIDE 1 MILLIGRAM(S): 2 INJECTION INTRAMUSCULAR; INTRAVENOUS; SUBCUTANEOUS at 03:37

## 2019-03-16 RX ADMIN — PIPERACILLIN AND TAZOBACTAM 25 GRAM(S): 4; .5 INJECTION, POWDER, LYOPHILIZED, FOR SOLUTION INTRAVENOUS at 13:20

## 2019-03-16 NOTE — PROGRESS NOTE ADULT - SUBJECTIVE AND OBJECTIVE BOX
ALEXSANDER BANDA  [id number]    GENERAL SURGERY/ DR. GERONIMO    NO FEVER, CHILLS, N/V  SOME INCISIONAL  PAIN  VOIDING, + FLATUS, NO BM    Vital Signs Last 24 Hrs  T(C): 36.6 (16 Mar 2019 07:41), Max: 37.4 (15 Mar 2019 20:09)  T(F): 97.8 (16 Mar 2019 07:41), Max: 99.3 (15 Mar 2019 20:09)  HR: 58 (16 Mar 2019 07:41) (48 - 86)  BP: 113/65 (16 Mar 2019 07:41) (108/62 - 140/73)  BP(mean): --  RR: 17 (16 Mar 2019 07:41) (12 - 18)  SpO2: 91% (16 Mar 2019 07:41) (91% - 97%)      03-15-19 @ 07:01  -  03-16-19 @ 07:00  --------------------------------------------------------  IN: 3026 mL / OUT: 2250 mL / NET: 776 mL    VOIDED         2200 ML  RODRIGO DRAIN  50 ML SANGUINEOUS DRAINAGE      POD # 1     LUNGS: CLEAR TO AUSCULTATION , NO W/R/R  ABDOMEN: UMBILICAL WOUND AND ALL TROCAR SITES ARE DRY AND INTACT NO BLEEDING/ HEMATOMA, BALKE DRAIN IN PLACE , + BS, SOFT, NON DISTENDED, SOME INCISIONAL TENDERNESS   EXTREMITY: NO EDEMA, NO CALF TENDERNESS                            11.9   10.88 )-----------( 229      ( 16 Mar 2019 04:26 )             37.1      03-16    143  |  109<H>  |  17  ----------------------------<  150<H>  4.4   |  28  |  1.20    Ca    8.1<L>      16 Mar 2019 04:26  Mg     2.2     03-16    TPro  8.3  /  Alb  4.0  /  TBili  0.4  /  DBili  x   /  AST  22  /  ALT  31  /  AlkPhos  85  03-14                        ASSESSMENT &  PLAN:     POD # 1 S/P LAPAROSCOPIC APPENDECTOMY FOR GANGRENOUS APPENDIX AND REPAIR UMBILICAL HERNIA   H/O MULTIPLE MYELOMA  H/O TYPE 2 DIABETES  H/O DVT    REGULAR DIABETIC DIET  OOB, AMBULATE  ENCOURAGE AMBULATION  CONTINUE ZOSYN  MAINTAIN RODRIGO DRAIN  D/C PLAN HOME TOMORROW

## 2019-03-16 NOTE — DISCHARGE NOTE PROVIDER - REASON FOR ADMISSION
RLQ ABDOMINAL PAIN  GANGRENOUS APPENDICITIS, UMBILICAL HERNIA   LAPAROSCOPIC APPENDECTOMY, REPAIR UMBILICAL HERNIA RLQ ABDOMINAL PAIN

## 2019-03-16 NOTE — DISCHARGE NOTE PROVIDER - NSDCCPCAREPLAN_GEN_ALL_CORE_FT
PRINCIPAL DISCHARGE DIAGNOSIS  Diagnosis: Acute gangrenous appendicitis  Assessment and Plan of Treatment:

## 2019-03-16 NOTE — DISCHARGE NOTE PROVIDER - NSDCACTIVITY_GEN_ALL_CORE
No heavy lifting/straining/Walking - Outdoors allowed/Stairs allowed/Walking - Indoors allowed/Showering allowed

## 2019-03-16 NOTE — DISCHARGE NOTE PROVIDER - HOSPITAL COURSE
69 YO MALE WITH H/O MULTIPLE MYELOMA POST SPINE SURGERY WITH POST OP DVT CURRENTLY ON ELIQUIS , TYPE 2 DIABETES, HYPERLIPIDEMIA,  NEUROPATHIC PAIN, S/P OPEN CHOLECYSTECTOMY , TO ER WITH ONE DAY H/O RLQ PAIN. THE PAIN STATED ON THURSDAY MORNING AS A DISCOMFORT AND GRADUALLY INCREASED THROUGHOUT THE DAY. HE DENIED ANY N/V, FEVER, CHILLS, CONSTIPATION, DIARRHEA CHANGE IN BOWEL HABITS, TRAUMA, RECENT TRAVEL, ETOH/ DRUG USE, URINARY SYMPTOMS. ANOREXIA. HE HAD A NORMAL EGD AND COLONOSCOPY 7 YEARS AGO.         PAST MEDICAL & SURGICAL HISTORY:    Hyperlipidemia (E78.5)    DM (diabetes mellitus) (E11.9)    Neuropathic pain (M79.2)    Blood clot in vein (I82.90): post-op    Multiple myeloma (C90.00)    S/P cholecystectomy (Z90.49): LAPAROSCOPIC CONVERT TO OPEN    History of esophagogastroduodenoscopy (EGD) (Z98.890)    H/O colonoscopy (Z98.890)    History of back surgery (Z98.89): tumor resection        Allergies    No Known Allergies    Intolerances        Home Medications:    aspirin 81 mg oral tablet: 1 tab(s) orally once a day (15 Mar 2019 08:29)    Eliquis 2.5 mg oral tablet: 1 tab(s) orally 2 times a day (15 Mar 2019 08:29)    gabapentin 600 mg oral tablet: 2 tab(s) orally 2 times a day (15 Mar 2019 08:29)    Lipitor 20 mg oral tablet: 1 tab(s) orally once a day (15 Mar 2019 08:29)    metaxalone 800 mg oral tablet: 1 tab(s) orally 3 times a day as needed (15 Mar 2019 08:29)    metFORMIN 500 mg oral tablet: 1 tab(s) orally 2 times a day (15 Mar 2019 08:29)        HOSPITAL COURSE ;        HE WAS ADMITTED , KEPT NPO, AND STARTED ON ZOSYN.    A MEDICAL AND CARDIOLOGY CLEARANCE WAS OBTAINED.    HE UNDERWENT A LAPAROSCOPIC APPENDECTOMY FOR GANGRENOUS APPENDIX AND REPAIR UMBILICAL HERNIA ON 03/15/2019. A BALKED DRAIN WAS PLACED INTRAOPERATIVELY.      POST OP CONTINUED ON ZOSYN, WAS STARTED ON DIABETIC DIET, AND DRAIN OUT PUT WAS MONITORED.      HE TOLERATED DIET WELL.        DISPOSITION: HOME, FOLLOW UP WITH DR. GERONIMO 67 YO MALE WITH H/O MULTIPLE MYELOMA POST SPINE SURGERY WITH POST OP DVT CURRENTLY ON ELIQUIS , TYPE 2 DIABETES, HYPERLIPIDEMIA,  NEUROPATHIC PAIN, S/P OPEN CHOLECYSTECTOMY , TO ER WITH ONE DAY H/O RLQ PAIN. THE PAIN STATED ON THURSDAY MORNING AS A DISCOMFORT AND GRADUALLY INCREASED THROUGHOUT THE DAY. HE DENIED ANY N/V, FEVER, CHILLS, CONSTIPATION, DIARRHEA CHANGE IN BOWEL HABITS, TRAUMA, RECENT TRAVEL, ETOH/ DRUG USE, URINARY SYMPTOMS. ANOREXIA. HE HAD A NORMAL EGD AND COLONOSCOPY 7 YEARS AGO.         PAST MEDICAL & SURGICAL HISTORY:    Hyperlipidemia (E78.5)    DM (diabetes mellitus) (E11.9)    Neuropathic pain (M79.2)    Blood clot in vein (I82.90): post-op    Multiple myeloma (C90.00)    S/P cholecystectomy (Z90.49): LAPAROSCOPIC CONVERT TO OPEN    History of esophagogastroduodenoscopy (EGD) (Z98.890)    H/O colonoscopy (Z98.890)    History of back surgery (Z98.89): tumor resection        Allergies    No Known Allergies    Intolerances        Home Medications:    aspirin 81 mg oral tablet: 1 tab(s) orally once a day (15 Mar 2019 08:29)    Eliquis 2.5 mg oral tablet: 1 tab(s) orally 2 times a day (15 Mar 2019 08:29)    gabapentin 600 mg oral tablet: 2 tab(s) orally 2 times a day (15 Mar 2019 08:29)    Lipitor 20 mg oral tablet: 1 tab(s) orally once a day (15 Mar 2019 08:29)    metaxalone 800 mg oral tablet: 1 tab(s) orally 3 times a day as needed (15 Mar 2019 08:29)    metFORMIN 500 mg oral tablet: 1 tab(s) orally 2 times a day (15 Mar 2019 08:29)        HOSPITAL COURSE ;        HE WAS ADMITTED , KEPT NPO, AND STARTED ON ZOSYN.    A MEDICAL AND CARDIOLOGY CLEARANCE WAS OBTAINED.    HE UNDERWENT A LAPAROSCOPIC APPENDECTOMY FOR GANGRENOUS APPENDIX AND REPAIR UMBILICAL HERNIA ON 03/15/2019. A RODRIGO DRAIN WAS PLACED INTRAOPERATIVELY.      POST OP CONTINUED ON ZOSYN, WAS STARTED ON DIABETIC DIET, AND DRAIN OUT PUT WAS MONITORED.      HE TOLERATED ADVANCEMENT OF DIET WELL.    BY DATE OF DISCHARGE HIS WBC'S HAD NORMALIZED AND THE DRAIN WAS DISCONTINUED.        DISPOSITION: HOME, FOLLOW UP WITH DR. GERONIMO

## 2019-03-16 NOTE — DISCHARGE NOTE PROVIDER - NSDCCPTREATMENT_GEN_ALL_CORE_FT
PRINCIPAL PROCEDURE  Procedure: Laparoscopic appendectomy  Findings and Treatment:       SECONDARY PROCEDURE  Procedure: Repair, hernia, umbilical, open, adult  Findings and Treatment:

## 2019-03-16 NOTE — DISCHARGE NOTE PROVIDER - CARE PROVIDER_API CALL
Tigre Lujan)  Surgery  [address] [facility], [address] [place] [id number]  Phone: [phone]  Fax: [phone]  Follow Up Time:

## 2019-03-16 NOTE — PROGRESS NOTE ADULT - ASSESSMENT
67y/o M. with hx of MM, DVT of RLE in 2014, Type 2 DM, and HLD presenting with RLQ abdominal pain and found to have acute appendicitis.      acute appendicitis  s/p LAPAROSCOPIC APPENDECTOMY FOR GANGRENOUS APPENDIX AND REPAIR UMBILICAL HERNIA   continue on zosyn   d/c plannig per surgery     -Type 2 DM:  continue low dose humalog sliding scale  -Hx of DVT:  would consider restarting anticoagulation when feasible post-op.  -Hyperlipidemia: resume statin    -VTE ppx: once cleared by surgery

## 2019-03-16 NOTE — PROGRESS NOTE ADULT - SUBJECTIVE AND OBJECTIVE BOX
Patient is a 68y old  Male who presents with a chief complaint of RLQ ABDOMINAL PAIN  GANGRENOUS APPENDICITIS, UMBILICAL HERNIA   LAPAROSCOPIC APPENDECTOMY, REPAIR UMBILICAL HERNIA (16 Mar 2019 09:45)        HPI:  67 YO MALE WITH H/O MULTIPLE MYELOMA, TYPE 2 DIABETES, HYPERLIPIDEMIA,  NEUROPATHIC PAIN, S/P OPEN CHOLECYSTECTOMY PRESENTED TO ER WITH ONE DAY H/O RLQ PAIN. THE PAIN STATED ON THURSDAY MORNING AS A DISCOMFORT AND GRADUALLY INCREASED THROUGHOUT THE DAY.     HE DENIES ANY N/V, FEVER, CHILLS, CONSTIPATION, DIARRHEA CHANGE IN BOWEL HABITS, TRAUMA, RECENT TRAVEL, ETOH/ DRUG USE, URINARY SYMPTOMS. ANOREXIA. HE HAD A NORMAL EGD AND COLONOSCOPY 7 YEARS AGO     HE LAST ATE AROUND 5 PM AND HAD A NORMAL BM YESTERDAY. (15 Mar 2019 05:54)      SUBJECTIVE & OBJECTIVE: Pt seen and examined at bedside, s/p appendectomy, no acute complaints      PHYSICAL EXAM:  T(C): 36.6 (19 @ 07:41), Max: 37.4 (03-15-19 @ 20:09)  HR: 58 (19 @ 07:41) (48 - 76)  BP: 113/65 (19 @ 07:41) (108/62 - 140/73)  RR: 17 (19 @ 07:41) (12 - 18)  SpO2: 91% (19 @ 07:41) (91% - 97%)  Wt(kg): --   GENERAL: NAD, well-groomed, well-developed  HEAD:  Atraumatic, Normocephalic  EYES: EOMI, PERRLA, conjunctiva and sclera clear  ENMT: Moist mucous membranes  NECK: Supple, No JVD  NERVOUS SYSTEM:  Alert & Oriented X3,  CHEST/LUNG: Clear to auscultation bilaterally; No rales, rhonchi, wheezing, or rubs  HEART: Regular rate and rhythm; No murmurs, rubs, or gallops  ABDOMEN: Soft, Nontender, Nondistended; Bowel sounds present  EXTREMITIES:  2+ Peripheral Pulses, No clubbing, cyanosis, or edema        MEDICATIONS  (STANDING):  atorvastatin 20 milliGRAM(s) Oral at bedtime  dextrose 5%. 1000 milliLiter(s) (50 mL/Hr) IV Continuous <Continuous>  dextrose 50% Injectable 12.5 Gram(s) IV Push once  dextrose 50% Injectable 25 Gram(s) IV Push once  dextrose 50% Injectable 25 Gram(s) IV Push once  gabapentin 1200 milliGRAM(s) Oral two times a day  insulin lispro (HumaLOG) corrective regimen sliding scale   SubCutaneous three times a day before meals  piperacillin/tazobactam IVPB. 3.375 Gram(s) IV Intermittent every 8 hours    MEDICATIONS  (PRN):  dextrose 40% Gel 15 Gram(s) Oral once PRN Blood Glucose LESS THAN 70 milliGRAM(s)/deciliter  glucagon  Injectable 1 milliGRAM(s) IntraMuscular once PRN Glucose LESS THAN 70 milligrams/deciliter  HYDROmorphone  Injectable 1 milliGRAM(s) IV Push every 4 hours PRN Severe Pain (7 - 10)  HYDROmorphone  Injectable 0.5 milliGRAM(s) IV Push every 4 hours PRN Moderate Pain (4 - 6)      LABS:                        11.9   10.88 )-----------( 229      ( 16 Mar 2019 04:26 )             37.1     03-16    143  |  109<H>  |  17  ----------------------------<  150<H>  4.4   |  28  |  1.20    Ca    8.1<L>      16 Mar 2019 04:26  Mg     2.2         TPro  8.3  /  Alb  4.0  /  TBili  0.4  /  DBili  x   /  AST  22  /  ALT  31  /  AlkPhos  85  03-14    PT/INR - ( 15 Mar 2019 06:09 )   PT: 14.5 sec;   INR: 1.27 ratio         PTT - ( 15 Mar 2019 06:09 )  PTT:35.6 sec  Urinalysis Basic - ( 14 Mar 2019 23:12 )    Color: Yellow / Appearance: Clear / S.020 / pH: x  Gluc: x / Ketone: Negative  / Bili: Negative / Urobili: Negative   Blood: x / Protein: 25 mg/dL / Nitrite: Negative   Leuk Esterase: Negative / RBC: 0-2 /HPF / WBC 0-2   Sq Epi: x / Non Sq Epi: Occasional / Bacteria: Occasional      Magnesium, Serum: 2.2 mg/dL ( @ 04:26)    CAPILLARY BLOOD GLUCOSE      POCT Blood Glucose.: 136 mg/dL (16 Mar 2019 11:19)  POCT Blood Glucose.: 125 mg/dL (16 Mar 2019 07:28)  POCT Blood Glucose.: 154 mg/dL (15 Mar 2019 21:13)  POCT Blood Glucose.: 120 mg/dL (15 Mar 2019 16:32)  POCT Blood Glucose.: 100 mg/dL (15 Mar 2019 13:20)      CAPILLARY BLOOD GLUCOSE      POCT Blood Glucose.: 136 mg/dL (16 Mar 2019 11:19)  POCT Blood Glucose.: 125 mg/dL (16 Mar 2019 07:28)  POCT Blood Glucose.: 154 mg/dL (15 Mar 2019 21:13)  POCT Blood Glucose.: 120 mg/dL (15 Mar 2019 16:32)  POCT Blood Glucose.: 100 mg/dL (15 Mar 2019 13:20)    CAPILLARY BLOOD GLUCOSE      POCT Blood Glucose.: 136 mg/dL (16 Mar 2019 11:19)            RECENT CULTURES:      RADIOLOGY & ADDITIONAL TESTS:                        DVT/GI ppx  Discussed with pt @ bedside

## 2019-03-17 ENCOUNTER — TRANSCRIPTION ENCOUNTER (OUTPATIENT)
Age: 69
End: 2019-03-17

## 2019-03-17 VITALS
HEART RATE: 70 BPM | OXYGEN SATURATION: 92 % | TEMPERATURE: 98 F | SYSTOLIC BLOOD PRESSURE: 125 MMHG | RESPIRATION RATE: 18 BRPM | DIASTOLIC BLOOD PRESSURE: 78 MMHG

## 2019-03-17 LAB
HCT VFR BLD CALC: 35.5 % — LOW (ref 39–50)
HGB BLD-MCNC: 11.5 G/DL — LOW (ref 13–17)
MCHC RBC-ENTMCNC: 29.6 PG — SIGNIFICANT CHANGE UP (ref 27–34)
MCHC RBC-ENTMCNC: 32.4 GM/DL — SIGNIFICANT CHANGE UP (ref 32–36)
MCV RBC AUTO: 91.5 FL — SIGNIFICANT CHANGE UP (ref 80–100)
NRBC # BLD: 0 /100 WBCS — SIGNIFICANT CHANGE UP (ref 0–0)
PLATELET # BLD AUTO: 230 K/UL — SIGNIFICANT CHANGE UP (ref 150–400)
RBC # BLD: 3.88 M/UL — LOW (ref 4.2–5.8)
RBC # FLD: 13.8 % — SIGNIFICANT CHANGE UP (ref 10.3–14.5)
WBC # BLD: 7.42 K/UL — SIGNIFICANT CHANGE UP (ref 3.8–10.5)
WBC # FLD AUTO: 7.42 K/UL — SIGNIFICANT CHANGE UP (ref 3.8–10.5)

## 2019-03-17 PROCEDURE — 74176 CT ABD & PELVIS W/O CONTRAST: CPT

## 2019-03-17 PROCEDURE — 96374 THER/PROPH/DIAG INJ IV PUSH: CPT

## 2019-03-17 PROCEDURE — 83605 ASSAY OF LACTIC ACID: CPT

## 2019-03-17 PROCEDURE — 83036 HEMOGLOBIN GLYCOSYLATED A1C: CPT

## 2019-03-17 PROCEDURE — C1889: CPT

## 2019-03-17 PROCEDURE — 85027 COMPLETE CBC AUTOMATED: CPT

## 2019-03-17 PROCEDURE — 96375 TX/PRO/DX INJ NEW DRUG ADDON: CPT

## 2019-03-17 PROCEDURE — 36415 COLL VENOUS BLD VENIPUNCTURE: CPT

## 2019-03-17 PROCEDURE — 80053 COMPREHEN METABOLIC PANEL: CPT

## 2019-03-17 PROCEDURE — 86901 BLOOD TYPING SEROLOGIC RH(D): CPT

## 2019-03-17 PROCEDURE — 99233 SBSQ HOSP IP/OBS HIGH 50: CPT

## 2019-03-17 PROCEDURE — 81001 URINALYSIS AUTO W/SCOPE: CPT

## 2019-03-17 PROCEDURE — 71045 X-RAY EXAM CHEST 1 VIEW: CPT

## 2019-03-17 PROCEDURE — 83735 ASSAY OF MAGNESIUM: CPT

## 2019-03-17 PROCEDURE — 80048 BASIC METABOLIC PNL TOTAL CA: CPT

## 2019-03-17 PROCEDURE — 82962 GLUCOSE BLOOD TEST: CPT

## 2019-03-17 PROCEDURE — 93005 ELECTROCARDIOGRAM TRACING: CPT

## 2019-03-17 PROCEDURE — 85730 THROMBOPLASTIN TIME PARTIAL: CPT

## 2019-03-17 PROCEDURE — 86900 BLOOD TYPING SEROLOGIC ABO: CPT

## 2019-03-17 PROCEDURE — 86850 RBC ANTIBODY SCREEN: CPT

## 2019-03-17 PROCEDURE — 85610 PROTHROMBIN TIME: CPT

## 2019-03-17 PROCEDURE — 86803 HEPATITIS C AB TEST: CPT

## 2019-03-17 PROCEDURE — 99285 EMERGENCY DEPT VISIT HI MDM: CPT | Mod: 25

## 2019-03-17 RX ORDER — APIXABAN 2.5 MG/1
1 TABLET, FILM COATED ORAL
Qty: 0 | Refills: 0 | COMMUNITY

## 2019-03-17 RX ORDER — OXYCODONE HYDROCHLORIDE 5 MG/1
1 TABLET ORAL
Qty: 30 | Refills: 0 | OUTPATIENT
Start: 2019-03-17

## 2019-03-17 RX ORDER — OXYCODONE HYDROCHLORIDE 5 MG/1
1 TABLET ORAL
Qty: 20 | Refills: 0 | OUTPATIENT
Start: 2019-03-17

## 2019-03-17 RX ADMIN — GABAPENTIN 1200 MILLIGRAM(S): 400 CAPSULE ORAL at 05:56

## 2019-03-17 RX ADMIN — PIPERACILLIN AND TAZOBACTAM 25 GRAM(S): 4; .5 INJECTION, POWDER, LYOPHILIZED, FOR SOLUTION INTRAVENOUS at 05:56

## 2019-03-17 NOTE — PROGRESS NOTE ADULT - SUBJECTIVE AND OBJECTIVE BOX
STATUS POST:  Lap. Appendectomy    POST OPERATIVE DAY #: 2    Vital Signs Last 24 Hrs  T(F): 98.2 (17 Mar 2019 07:55), Max: 98.2 (17 Mar 2019 07:55)  HR: 70 (17 Mar 2019 07:55) (63 - 70)  BP: 125/78 (17 Mar 2019 07:55) (125/78 - 131/71)  RR: 18 (17 Mar 2019 07:55) (18 - 20)  SpO2: 92% (17 Mar 2019 07:55) (92% - 93%)    SUBJECTIVE: Pt seen resting comfortably in chair and then walking in halls, pleasant and conversational, tolerating diet without GI complaint, good pain control and eager for discharge to home.    BAM: minimal light serosang output.    General Appearance: Appears well, NAD  Neck: Supple  Chest: Equal expansion bilaterally, equal breath sounds  CV: Pulse regular presently  Abdomen: Obese but soft and nontense with minimal/ appropriate incisional tenderness, RLQ WNL, wounds clean and dry and intact, BAM X1 secure.  Extremities: Grossly symmetric, SCD's in place     I&O's Summary    16 Mar 2019 07:01  -  17 Mar 2019 07:00  --------------------------------------------------------  IN: 100 mL / OUT: 20 mL / NET: 80 mL      I&O's Detail    16 Mar 2019 07:01  -  17 Mar 2019 07:00  --------------------------------------------------------  IN:    Solution: 100 mL  Total IN: 100 mL    OUT:    Bulb: 20 mL  Total OUT: 20 mL    Total NET: 80 mL    MEDICATIONS  (STANDING):  atorvastatin 20 milliGRAM(s) Oral at bedtime  dextrose 5%. 1000 milliLiter(s) (50 mL/Hr) IV Continuous <Continuous>  dextrose 50% Injectable 12.5 Gram(s) IV Push once  dextrose 50% Injectable 25 Gram(s) IV Push once  dextrose 50% Injectable 25 Gram(s) IV Push once  gabapentin 1200 milliGRAM(s) Oral two times a day  insulin lispro (HumaLOG) corrective regimen sliding scale   SubCutaneous three times a day before meals  piperacillin/tazobactam IVPB. 3.375 Gram(s) IV Intermittent every 8 hours    MEDICATIONS  (PRN):  dextrose 40% Gel 15 Gram(s) Oral once PRN Blood Glucose LESS THAN 70 milliGRAM(s)/deciliter  glucagon  Injectable 1 milliGRAM(s) IntraMuscular once PRN Glucose LESS THAN 70 milligrams/deciliter  HYDROmorphone  Injectable 1 milliGRAM(s) IV Push every 4 hours PRN Severe Pain (7 - 10)  HYDROmorphone  Injectable 0.5 milliGRAM(s) IV Push every 4 hours PRN Moderate Pain (4 - 6)    LABS:                        11.5   7.42  )-----------( 230      ( 17 Mar 2019 05:06 )             35.5     03-16    143  |  109<H>  |  17  ----------------------------<  150<H>  4.4   |  28  |  1.20    Ca    8.1<L>      16 Mar 2019 04:26  Mg     2.2     03-16    RADIOLOGY & ADDITIONAL STUDIES: No new studies ordered and no new results to review.

## 2019-03-17 NOTE — PROGRESS NOTE ADULT - ASSESSMENT
S/P Lap Appy for gangrenous appendicitis.  Clinically progressing quite well.  Surgically stable.  Vitals reassuring and drain d/c'd without issue.  Abdomen benign and free of any evidence of peritoneal irritation.  Labs show normalized white blood cell count.  Discharge to home.  Complete transition of course of abx to orals.  Script forwarded to pharmacy for pain medication.  Advised regarding stool softener.  Encouraged to call with interval questions or concerns.  F/U with operating surgeon in office over next 1-2 weeks.

## 2019-03-17 NOTE — PROGRESS NOTE ADULT - ASSESSMENT
69y/o M. with hx of MM, DVT of RLE in 2014, Type 2 DM, and HLD presenting with RLQ abdominal pain and found to have acute appendicitis.    -Acute appendicitis:   s/p POD # 2S/P LAPAROSCOPIC APPENDECTOMY FOR GANGRENOUS APPENDIX AND REPAIR   -Type 2 DM  controlled   continue low dose Humalog sliding scale/ at dc resume home meds .  -Hx of DVT:  would consider restarting anticoagulation when  ok with surgery home dose Eliquis  .  -Hyperlipidemia: restart statin PO.   medically stable dc plan as per surgery today.

## 2019-03-17 NOTE — DISCHARGE NOTE NURSING/CASE MANAGEMENT/SOCIAL WORK - NSDCDPATPORTLINK_GEN_ALL_CORE
You can access the [facility] Patient Portal, offered by [facility], by registering with the following website: [email or web address]

## 2019-03-17 NOTE — PROGRESS NOTE ADULT - SUBJECTIVE AND OBJECTIVE BOX
Patient is a 68y old  Male who presents with a chief complaint of RLQ ABDOMINAL PAIN (16 Mar 2019 11:37)     medical fu up consult   HPI:  67 YO MALE WITH H/O MULTIPLE MYELOMA, TYPE 2 DIABETES, HYPERLIPIDEMIA,  NEUROPATHIC PAIN, S/P OPEN CHOLECYSTECTOMY PRESENTED TO ER WITH ONE DAY H/O RLQ PAIN. THE PAIN STATED ON THURSDAY MORNING AS A DISCOMFORT AND GRADUALLY INCREASED THROUGHOUT THE DAY.     HE DENIES ANY N/V, FEVER, CHILLS, CONSTIPATION, DIARRHEA CHANGE IN BOWEL HABITS, TRAUMA, RECENT TRAVEL, ETOH/ DRUG USE, URINARY SYMPTOMS. ANOREXIA. HE HAD A NORMAL EGD AND COLONOSCOPY 7 YEARS AGO     HE LAST ATE AROUND 5 PM AND HAD A NORMAL BM YESTERDAY. (15 Mar 2019 05:54)  admitted with -Acute appendicitis:   s/p POD # 2 S/P LAPAROSCOPIC APPENDECTOMY FOR GANGRENOUS APPENDIX AND REPAIR . pt seen and examine today alert awake , sitting on chair , tolerating po , no fever , state no abd  pain .       INTERVAL HPI/OVERNIGHT EVENTS:  T(C): 36.8 (03-17-19 @ 07:55), Max: 36.8 (03-16-19 @ 16:26)  HR: 70 (03-17-19 @ 07:55) (62 - 70)  BP: 125/78 (03-17-19 @ 07:55) (101/52 - 131/71)  RR: 18 (03-17-19 @ 07:55) (18 - 20)  SpO2: 92% (03-17-19 @ 07:55) (90% - 93%)  Wt(kg): --  I&O's Summary    16 Mar 2019 07:01  -  17 Mar 2019 07:00  --------------------------------------------------------  IN: 100 mL / OUT: 20 mL / NET: 80 mL        REVIEW OF SYSTEMS:  CONSTITUTIONAL: No fever, weight loss, or fatigue  EYES: No eye pain, visual disturbances, or discharge  ENMT:  No sinus or throat pain  NECK: No pain or stiffness    RESPIRATORY: No cough, wheezing, No shortness of breath  CARDIOVASCULAR: No chest pain, palpitations, dizziness, or leg swelling  GASTROINTESTINAL: No abdominal pain . No nausea, vomiting, or hematemesis; No diarrhea or constipation.   GENITOURINARY: No dysuria, frequency  NEUROLOGICAL: No headaches, memory loss, loss of strength, numbness,   SKIN: No itching, burning, rashes, or lesions   MUSCULOSKELETAL: No joint pain or swelling; No muscle, back, or extremity pain    PHYSICAL EXAM:  GENERAL: NAD, well-groomed, well-developed  HEAD:  Atraumatic, Normocephalic  EYES: EOMI, PERRLA, conjunctiva and sclera clear  ENMT:  Moist mucous membranes, No lesions  NECK: Supple, No JVD,   NERVOUS SYSTEM:  Alert & Oriented X3, ; Motor Strength 5/5 B/L upper and lower extremities; DTRs 2+ intact and symmetric  CHEST/LUNG: Clear to percussion bilaterally; No rales, rhonchi, wheezing,   HEART: Regular rate and rhythm; No murmurs, no tachy   ABDOMEN: Soft, obese  no tender, Nondistended; Bowel sounds present , surgical site clean drain +  EXTREMITIES:  2+ Peripheral Pulses, No clubbing, cyanosis, or edema  SKIN: No rashes or lesions    MEDICATIONS  (STANDING):  atorvastatin 20 milliGRAM(s) Oral at bedtime  dextrose 5%. 1000 milliLiter(s) (50 mL/Hr) IV Continuous <Continuous>  dextrose 50% Injectable 12.5 Gram(s) IV Push once  dextrose 50% Injectable 25 Gram(s) IV Push once  dextrose 50% Injectable 25 Gram(s) IV Push once  gabapentin 1200 milliGRAM(s) Oral two times a day  insulin lispro (HumaLOG) corrective regimen sliding scale   SubCutaneous three times a day before meals  piperacillin/tazobactam IVPB. 3.375 Gram(s) IV Intermittent every 8 hours    MEDICATIONS  (PRN):  dextrose 40% Gel 15 Gram(s) Oral once PRN Blood Glucose LESS THAN 70 milliGRAM(s)/deciliter  glucagon  Injectable 1 milliGRAM(s) IntraMuscular once PRN Glucose LESS THAN 70 milligrams/deciliter  HYDROmorphone  Injectable 1 milliGRAM(s) IV Push every 4 hours PRN Severe Pain (7 - 10)  HYDROmorphone  Injectable 0.5 milliGRAM(s) IV Push every 4 hours PRN Moderate Pain (4 - 6)      LABS:                        11.5   7.42  )-----------( 230      ( 17 Mar 2019 05:06 )             35.5     03-16    143  |  109<H>  |  17  ----------------------------<  150<H>  4.4   |  28  |  1.20    Ca    8.1<L>      16 Mar 2019 04:26  Mg     2.2     03-16          CAPILLARY BLOOD GLUCOSE      POCT Blood Glucose.: 105 mg/dL (17 Mar 2019 11:33)  POCT Blood Glucose.: 142 mg/dL (17 Mar 2019 09:47)  POCT Blood Glucose.: 116 mg/dL (16 Mar 2019 21:19)  POCT Blood Glucose.: 135 mg/dL (16 Mar 2019 16:48)              RADIOLOGY & ADDITIONAL TESTS:    Imaging Personally Reviewed:     no new test   Advance Directives:  full code

## 2019-03-18 PROBLEM — E11.9 TYPE 2 DIABETES MELLITUS WITHOUT COMPLICATIONS: Chronic | Status: ACTIVE | Noted: 2019-03-14

## 2019-03-18 PROBLEM — E78.5 HYPERLIPIDEMIA, UNSPECIFIED: Chronic | Status: ACTIVE | Noted: 2019-03-15

## 2019-03-26 ENCOUNTER — APPOINTMENT (OUTPATIENT)
Dept: SURGERY | Facility: CLINIC | Age: 69
End: 2019-03-26
Payer: COMMERCIAL

## 2019-03-26 VITALS
BODY MASS INDEX: 33.6 KG/M2 | SYSTOLIC BLOOD PRESSURE: 118 MMHG | WEIGHT: 240 LBS | OXYGEN SATURATION: 97 % | DIASTOLIC BLOOD PRESSURE: 72 MMHG | HEART RATE: 82 BPM | HEIGHT: 71 IN

## 2019-03-26 PROCEDURE — 99024 POSTOP FOLLOW-UP VISIT: CPT

## 2019-06-12 NOTE — PATIENT PROFILE ADULT - IS THERE A SUSPICION OF ABUSE/NEGLIGENCE?
"Patient ID: Enrique Mcdonald is a 16year old male. Chief Complaint   Patient presents with   â¢ Knee Pain      right  x 2 weeks       HPI:  Here with mother to discuss right knee pain. Approximately 2 weeks ago he was playing basketball with friends and was tackled to the ground with his lateral right knee hitting a tree stump and then someone landing on the medial aspect with some pain noted at that time and is developed some bruising laterally as no larger than quarter size bruises that are at this point a yellow discoloration. He then was playing baseball and was running the bases when he felt a pop in his knee and is since had a lateral knee discomfort. There was a little bit of swelling that took place but there is no locking or catching that they have noted. No distal swelling or paresthesias associated with it. He is otherwise doing well and no new complaints from him or his mother. He has type 1 diabetes mellitus and continues follow-up [place] [facility] office with no concerns of hyper or hypoglycemic symptoms recently but he has recorded a higher blood sugar readings with his mother saying he is been snacking more through this      Allergies:   ALLERGIES:   Allergen Reactions   â¢ Amoxicillin RASH   â¢ Mucinex RASH     Medications:   Current Outpatient Medications   Medication Sig Dispense Refill   â¢ ondansetron (ZOFRAN) 4 MG tablet Reported on 2/13/2017     â¢ omeprazole (PRILOSEC) 20 MG capsule Take 20 mg by mouth daily.      â¢ ONETOUCH DELICA LANCETS 26D Misc Check blood sugar 5-6 times per day     â¢ Insulin Syringe-Needle U-100 (B-D INSULIN SYRINGE HALF-UNIT) 31G X 5/16"" 0.3 ML Misc Inject insulin 6 times per day     â¢ Insulin Pen Needle (BD PEN NEEDLE LEA U/F) 32G X 4 MM Misc AS DIRECTED WITH BAAGLAR PEN     â¢ insulin lispro (HUMALOG) 100 UNIT/ML injectable solution Inject up to 85 units daily     â¢ Insulin Infusion Pump (T:SLIM X2) Device      â¢ insulin glargine (BASAGLAR KWIKPEN) 100 " "UNIT/ML pen-injector Inject 14 units daily when not using the pump     â¢ insulin aspart (NOVOLOG) 100 UNIT/ML injectable solution Give up to 100 units per day. â¢ blood glucose (TRUE METRIX BLOOD GLUCOSE TEST) test strip      â¢ blood glucose (ONETOUCH VERIO) test strip Check blood sugar 6-7 times per day. â¢ blood glucose (TRUE METRIX BLOOD GLUCOSE TEST) test strip by In Vitro route. Use as directed     â¢ glucagon (GLUCAGON EMERGENCY) 1 MG injection kit Inject for severe low blood sugar, unconscious, seizure, unable to swallow     â¢ Blood Glucose Monitoring Suppl (ONE TOUCH ULTRA MINI) w/Device Kit Check blood sugar 5-6 times per day     â¢ albuterol (VENTOLIN) (2.5 MG/3ML) 0.083% nebulizer solution Reported on 2/13/2017     â¢ acetone, urine, test (KETOSTIX) strip      â¢ loratadine (CLARITIN) 5 MG chewable tablet      â¢ acetone, urine, test (KETOSTIX) strip CHECK FOR KETONES WHEN BLOOD SUGAR IS UNEXPECTEDLY OVER 250 MG/DL OR IF CUCO IS ILL IN ANYWAY SHOULD CHECK FOR KETONES. â¢ amphetamine-dextroamphetamine XR (ADDERALL XR) 15 MG 24 hr capsule Take 1 capsule by mouth daily. 30 capsule 0     No current facility-administered medications for this visit. Ros: Review of Systems  Past medical history:   Past Medical History:   Diagnosis Date   â¢ Diabetes mellitus (CMS/HCC)     type 1     Past surgical history: There is no previous surgical history on file. Social history:   Social History     Occupational History   â¢ Not on file   Tobacco Use   â¢ Smoking status: Not on file   Substance and Sexual Activity   â¢ Alcohol use: Not on file   â¢ Drug use: Not on file   â¢ Sexual activity: Not on file       All other systems review as normal/noncontributory unless otherwise indicated  Physical:  Visit Vitals  /66   Pulse 84   Temp 98.5 Â°F (36.9 Â°C)   Resp 20   Ht 5' 3"" (1.6 m)   Wt 56.7 kg (125 lb)   BMI 22.14 kg/mÂ²     Physical Exam   Constitutional: He appears well-developed. No distress.    HENT:   Head: " Normocephalic. Right Ear: External ear normal.   Left Ear: External ear normal.   Nose: Nose normal.   Mouth/Throat: Oropharynx is clear and moist.   Eyes: Pupils are equal, round, and reactive to light. Conjunctivae and EOM are normal.   Neck: Neck supple. Cardiovascular: Normal rate and regular rhythm. Pulmonary/Chest: Effort normal and breath sounds normal.   Abdominal: He exhibits no distension. Musculoskeletal: He exhibits no edema. Neurological: He is alert. No cranial nerve deficit. He exhibits normal muscle tone. Skin: Skin is dry. No rash noted. He is not diaphoretic. No erythema. No pallor. Psychiatric: He has a normal mood and affect. His behavior is normal. Thought content normal.   Vitals reviewed. Examination of the knee reveals no unusual redness, swelling or warmth. The bruises noted as above. He is tender on the lateral aspect of the patella and compression of the patella and the patellofemoral groove. The collateral cruciate ligaments are intact. ASSESSMENT:  Problem List Items Addressed This Visit        Musculoskeletal    Patellofemoral pain syndrome of right knee - Primary          No orders of the defined types were placed in this encounter. PLAN: He did report that a sports therapist was there at the baseball practice and examined his knee and was concerned about a ligament issue but I see no concerning leg years to be kneecap issue and likely patellofemoral syndrome developing so I am referring to physical therapy. They will notify if it does not improve with the therapy or if there is any worsening symptoms otherwise. He will follow-up here for well child visits and diabetic follow-up as scheduled.     Chayo White MD no

## 2020-03-20 ENCOUNTER — TRANSCRIPTION ENCOUNTER (OUTPATIENT)
Age: 70
End: 2020-03-20

## 2022-02-17 NOTE — ED ADULT NURSE NOTE - WEIGHT IN KG
Pt's sister Antoinette called wanted to let Dr Campos's know that Delcenia was very manic. She wants a call back. She is on the  Verbal [phone]   111.1

## 2022-08-24 ENCOUNTER — APPOINTMENT (OUTPATIENT)
Dept: GASTROENTEROLOGY | Facility: CLINIC | Age: 72
End: 2022-08-24

## 2022-08-24 VITALS
HEIGHT: 71 IN | HEART RATE: 60 BPM | SYSTOLIC BLOOD PRESSURE: 100 MMHG | WEIGHT: 229 LBS | BODY MASS INDEX: 32.06 KG/M2 | OXYGEN SATURATION: 97 % | TEMPERATURE: 96.9 F | DIASTOLIC BLOOD PRESSURE: 60 MMHG

## 2022-08-24 DIAGNOSIS — Z85.79 PERSONAL HISTORY OF OTHER MALIGNANT NEOPLASMS OF LYMPHOID, HEMATOPOIETIC AND RELATED TISSUES: ICD-10-CM

## 2022-08-24 DIAGNOSIS — Z00.00 ENCOUNTER FOR GENERAL ADULT MEDICAL EXAMINATION W/OUT ABNORMAL FINDINGS: ICD-10-CM

## 2022-08-24 DIAGNOSIS — Z80.1 FAMILY HISTORY OF MALIGNANT NEOPLASM OF TRACHEA, BRONCHUS AND LUNG: ICD-10-CM

## 2022-08-24 DIAGNOSIS — Z86.39 PERSONAL HISTORY OF OTHER ENDOCRINE, NUTRITIONAL AND METABOLIC DISEASE: ICD-10-CM

## 2022-08-24 DIAGNOSIS — Z86.69 PERSONAL HISTORY OF OTHER DISEASES OF THE NERVOUS SYSTEM AND SENSE ORGANS: ICD-10-CM

## 2022-08-24 DIAGNOSIS — Z12.11 ENCOUNTER FOR SCREENING FOR MALIGNANT NEOPLASM OF COLON: ICD-10-CM

## 2022-08-24 DIAGNOSIS — Z86.79 PERSONAL HISTORY OF OTHER DISEASES OF THE CIRCULATORY SYSTEM: ICD-10-CM

## 2022-08-24 DIAGNOSIS — Z87.891 PERSONAL HISTORY OF NICOTINE DEPENDENCE: ICD-10-CM

## 2022-08-24 PROCEDURE — 99203 OFFICE O/P NEW LOW 30 MIN: CPT

## 2022-08-24 RX ORDER — GABAPENTIN 600 MG/1
600 TABLET, COATED ORAL
Refills: 0 | Status: ACTIVE | COMMUNITY

## 2022-08-24 RX ORDER — APIXABAN 2.5 MG/1
2.5 TABLET, FILM COATED ORAL
Refills: 0 | Status: ACTIVE | COMMUNITY

## 2022-08-24 RX ORDER — POLYETHYLENE GLYCOL-3350, SODIUM CHLORIDE, POTASSIUM CHLORIDE AND SODIUM BICARBONATE 420; 11.2; 5.72; 1.48 G/438.4G; G/438.4G; G/438.4G; G/438.4G
420 POWDER, FOR SOLUTION ORAL
Qty: 1 | Refills: 0 | Status: ACTIVE | COMMUNITY
Start: 2022-08-24 | End: 1900-01-01

## 2022-08-24 RX ORDER — VERAPAMIL HYDROCHLORIDE 240 MG/1
240 CAPSULE, DELAYED RELEASE PELLETS ORAL
Refills: 0 | Status: ACTIVE | COMMUNITY

## 2022-08-24 RX ORDER — ATORVASTATIN CALCIUM 20 MG/1
20 TABLET, FILM COATED ORAL
Refills: 0 | Status: ACTIVE | COMMUNITY

## 2022-08-24 RX ORDER — CHROMIUM 200 MCG
TABLET ORAL
Refills: 0 | Status: ACTIVE | COMMUNITY

## 2022-08-24 NOTE — ASSESSMENT
[FreeTextEntry1] : Impression and plan\par \par Patient came to the office today to arrange for colonoscopy.  It has been more than 5 years since last examination.  Patient feels well and offers no present complaints.  The risks benefits alternatives and limitations of procedure were discussed.  I will make further suggestions after above testing is complete.

## 2022-08-24 NOTE — HISTORY OF PRESENT ILLNESS
[FreeTextEntry1] : Patient came to the office today to arrange for colonoscopy.  It has been about 5 years since his last examination.  The patient has a history of multiple myeloma and has been under care with hematology.  He states that he has been in remission.  The patient has had previous appendectomy in 2019.  He feels well and offers no complaints.  He denies current nausea vomiting fever chills rectal bleeding or melena.

## 2022-08-26 NOTE — ED ADULT NURSE NOTE - CAS EDP DISCH DISPOSITION ADMI
CC:  Randal Briones is here today for Transitional Care Management    Medications: medications verified and updated  Refills needed today?  NO  Preferred pharmacy added   Denies Latex allergy or sensitivity  Tobacco history: verified    Patient would like communication of their results via:      Cell Phone:   Telephone Information:   Mobile [phone]     Okay to leave a message containing results? Yes  Health Maintenance Due   Topic Date Due   • Hepatitis B Vaccine (1 of 3 - 3-dose series) Never done   • Hepatitis C Screening  Never done   • COVID-19 Vaccine (3 - Booster for Moderna series) 10/04/2021   • Abdominal Aortic Aneurysm (AAA) Screening  Never done   • Lung Cancer Screening  02/17/2022     Discussed vaccines with patient   Hep C order pending   AAA screening order pending     Concerns: Patient is here for a hospital stay for Ischemic stroke on 8/22 at [facility]. He was admitted on 8/22 and discharged on 8/23.                             [facility]

## 2022-10-11 LAB — SARS-COV-2 N GENE NPH QL NAA+PROBE: NOT DETECTED

## 2022-10-12 ENCOUNTER — APPOINTMENT (OUTPATIENT)
Dept: GASTROENTEROLOGY | Facility: AMBULATORY MEDICAL SERVICES | Age: 72
End: 2022-10-12

## 2022-10-12 PROCEDURE — 45378 DIAGNOSTIC COLONOSCOPY: CPT | Mod: PT

## 2022-12-27 NOTE — ED PROVIDER NOTE - CHPI ED SYMPTOMS NEG
Department of Anesthesiology  Postprocedure Note    Patient: Gómez Persaud  MRN: [id number]  YOB: 1990  Date of evaluation: 12/27/2022      Procedure Summary     Date: 12/27/22 Room / Location: [address] [place] [address]    Anesthesia Start: 5746 Anesthesia Stop:     Procedure: EGD ESOPHAGOGASTRODUODENOSCOPY WITH BIOPSIES Diagnosis: Epigastric pain    Surgeons: Pascual Allison MD Responsible Provider: DAVID Farr CRNA    Anesthesia Type: MAC ASA Status: 1          Anesthesia Type: No value filed.     Abdon Phase I: Abdon Score: 10    Abdon Phase II:        Anesthesia Post Evaluation    Patient location during evaluation: bedside  Patient participation: complete - patient participated  Level of consciousness: awake and awake and alert  Pain score: 0  Airway patency: patent  Nausea & Vomiting: no nausea and no vomiting  Complications: no  Cardiovascular status: blood pressure returned to baseline and hemodynamically stable  Respiratory status: acceptable and spontaneous ventilation  Hydration status: euvolemic
no fever/no chills

## 2023-09-05 ENCOUNTER — APPOINTMENT (OUTPATIENT)
Dept: OTOLARYNGOLOGY | Facility: CLINIC | Age: 73
End: 2023-09-05
Payer: MEDICARE

## 2023-09-05 ENCOUNTER — NON-APPOINTMENT (OUTPATIENT)
Age: 73
End: 2023-09-05

## 2023-09-05 VITALS
WEIGHT: 235 LBS | BODY MASS INDEX: 32.9 KG/M2 | HEART RATE: 63 BPM | DIASTOLIC BLOOD PRESSURE: 65 MMHG | HEIGHT: 71 IN | SYSTOLIC BLOOD PRESSURE: 111 MMHG

## 2023-09-05 DIAGNOSIS — H61.20 IMPACTED CERUMEN, UNSPECIFIED EAR: ICD-10-CM

## 2023-09-05 PROCEDURE — 69210 REMOVE IMPACTED EAR WAX UNI: CPT

## 2023-09-05 PROCEDURE — 99204 OFFICE O/P NEW MOD 45 MIN: CPT | Mod: 25

## 2023-09-05 NOTE — PHYSICAL EXAM
[de-identified] : MEGHANA IMPACTED CERUMEN REMOVED/ HEARING IMPROVED [Normal] : mucosa is normal [Midline] : trachea located in midline position

## 2023-09-05 NOTE — REVIEW OF SYSTEMS
[Ear Pain] : ear pain [Hearing Loss] : hearing loss [Easy Bruising] : tendency for easy bruising [Negative] : Endocrine [Patient Intake Form Reviewed] : Patient intake form was reviewed

## 2024-03-27 ENCOUNTER — APPOINTMENT (OUTPATIENT)
Dept: OTOLARYNGOLOGY | Facility: CLINIC | Age: 74
End: 2024-03-27

## 2024-08-31 ENCOUNTER — NON-APPOINTMENT (OUTPATIENT)
Age: 74
End: 2024-08-31

## 2024-11-01 ENCOUNTER — APPOINTMENT (OUTPATIENT)
Dept: OTOLARYNGOLOGY | Facility: CLINIC | Age: 74
End: 2024-11-01
Payer: MEDICARE

## 2024-11-01 VITALS
HEART RATE: 68 BPM | SYSTOLIC BLOOD PRESSURE: 132 MMHG | WEIGHT: 235 LBS | HEIGHT: 71 IN | BODY MASS INDEX: 32.9 KG/M2 | DIASTOLIC BLOOD PRESSURE: 68 MMHG

## 2024-11-01 DIAGNOSIS — H93.8X1 OTHER SPECIFIED DISORDERS OF RIGHT EAR: ICD-10-CM

## 2024-11-01 DIAGNOSIS — H61.23 IMPACTED CERUMEN, BILATERAL: ICD-10-CM

## 2024-11-01 PROCEDURE — 92567 TYMPANOMETRY: CPT

## 2024-11-01 PROCEDURE — G0268 REMOVAL OF IMPACTED WAX MD: CPT

## 2024-11-01 PROCEDURE — 92557 COMPREHENSIVE HEARING TEST: CPT

## 2024-11-01 PROCEDURE — 99214 OFFICE O/P EST MOD 30 MIN: CPT | Mod: 25
